# Patient Record
Sex: FEMALE | Race: BLACK OR AFRICAN AMERICAN | Employment: PART TIME | URBAN - NONMETROPOLITAN AREA
[De-identification: names, ages, dates, MRNs, and addresses within clinical notes are randomized per-mention and may not be internally consistent; named-entity substitution may affect disease eponyms.]

---

## 2017-09-28 ENCOUNTER — APPOINTMENT (OUTPATIENT)
Dept: CT IMAGING | Age: 42
DRG: 089 | End: 2017-09-28
Payer: COMMERCIAL

## 2017-09-28 ENCOUNTER — APPOINTMENT (OUTPATIENT)
Dept: ULTRASOUND IMAGING | Age: 42
DRG: 089 | End: 2017-09-28
Payer: COMMERCIAL

## 2017-09-28 ENCOUNTER — HOSPITAL ENCOUNTER (INPATIENT)
Age: 42
LOS: 2 days | Discharge: HOME | DRG: 089 | End: 2017-09-30
Attending: EMERGENCY MEDICINE | Admitting: SURGERY
Payer: COMMERCIAL

## 2017-09-28 ENCOUNTER — APPOINTMENT (OUTPATIENT)
Dept: GENERAL RADIOLOGY | Age: 42
DRG: 089 | End: 2017-09-28
Payer: COMMERCIAL

## 2017-09-28 DIAGNOSIS — R91.8 MULTIPLE NODULES OF LUNG: ICD-10-CM

## 2017-09-28 DIAGNOSIS — D64.9 CHRONIC ANEMIA: ICD-10-CM

## 2017-09-28 DIAGNOSIS — S43.401A SPRAIN OF RIGHT SHOULDER, UNSPECIFIED SHOULDER SPRAIN TYPE, INITIAL ENCOUNTER: ICD-10-CM
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
## 2017-09-28 DIAGNOSIS — R91.1 INCIDENTAL PULMONARY NODULE: ICD-10-CM

## 2017-09-28 DIAGNOSIS — S01.511A LIP LACERATION, INITIAL ENCOUNTER: ICD-10-CM
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
## 2017-09-28 DIAGNOSIS — S27.321A CONTUSION OF LEFT LUNG, INITIAL ENCOUNTER: ICD-10-CM

## 2017-09-28 PROBLEM — S30.811A ABDOMINAL WALL ABRASION: Status: ACTIVE | Noted: 2017-09-28

## 2017-09-28 PROBLEM — S06.0XAA CLOSED HEAD INJURY WITH CONCUSSION: Status: ACTIVE | Noted: 2017-09-28

## 2017-09-28 LAB
ALBUMIN SERPL-MCNC: 4.2 G/DL (ref 3.5–5.1)
ALP BLD-CCNC: 89 U/L (ref 38–126)
ALT SERPL-CCNC: 22 U/L (ref 11–66)
ANION GAP SERPL CALCULATED.3IONS-SCNC: 11 MEQ/L (ref 8–16)
ANISOCYTOSIS: ABNORMAL
APTT: 29.3 SECONDS (ref 22–38)
AST SERPL-CCNC: 29 U/L (ref 5–40)
BASOPHILIA: ABNORMAL
BASOPHILS # BLD: 1.2 %
BASOPHILS ABSOLUTE: 0.1 THOU/MM3 (ref 0–0.1)
BILIRUB SERPL-MCNC: 0.2 MG/DL (ref 0.3–1.2)
BILIRUBIN URINE: NEGATIVE
BLOOD, URINE: NEGATIVE
BUN BLDV-MCNC: 12 MG/DL (ref 7–22)
CALCIUM SERPL-MCNC: 9 MG/DL (ref 8.5–10.5)
CHARACTER, URINE: NORMAL
CHLORIDE BLD-SCNC: 102 MEQ/L (ref 98–111)
CO2: 25 MEQ/L (ref 23–33)
COLOR: YELLOW
CREAT SERPL-MCNC: 0.8 MG/DL (ref 0.4–1.2)
DIFFERENTIAL TYPE: ABNORMAL
EKG ATRIAL RATE: 86 BPM
EKG P AXIS: 60 DEGREES
EKG P-R INTERVAL: 162 MS
EKG Q-T INTERVAL: 350 MS
EKG QRS DURATION: 84 MS
EKG QTC CALCULATION (BAZETT): 418 MS
EKG R AXIS: 74 DEGREES
EKG T AXIS: 33 DEGREES
EKG VENTRICULAR RATE: 86 BPM
EOSINOPHIL # BLD: 3.1 %
EOSINOPHILS ABSOLUTE: 0.1 THOU/MM3 (ref 0–0.4)
GLUCOSE BLD-MCNC: 111 MG/DL (ref 70–108)
GLUCOSE URINE: NEGATIVE MG/DL
HCT VFR BLD CALC: 27.3 % (ref 37–47)
HCT VFR BLD CALC: 29.8 % (ref 37–47)
HEMOGLOBIN: 8.2 GM/DL (ref 12–16)
HEMOGLOBIN: 8.9 GM/DL (ref 12–16)
HYPOCHROMIA: ABNORMAL
INR BLD: 0.93 (ref 0.85–1.13)
KETONES, URINE: NEGATIVE
LEUKOCYTE ESTERASE, URINE: NEGATIVE
LIPASE: 37.8 U/L (ref 5.6–51.3)
LYMPHOCYTES # BLD: 15.6 %
LYMPHOCYTES ABSOLUTE: 0.7 THOU/MM3 (ref 1–4.8)
MCH RBC QN AUTO: 18.6 PG (ref 27–31)
MCHC RBC AUTO-ENTMCNC: 30 GM/DL (ref 33–37)
MCV RBC AUTO: 61.9 FL (ref 81–99)
MICROCYTES: ABNORMAL
MONOCYTES # BLD: 12.1 %
MONOCYTES ABSOLUTE: 0.5 THOU/MM3 (ref 0.4–1.3)
NITRITE, URINE: NEGATIVE
NUCLEATED RED BLOOD CELLS: 0 /100 WBC
OSMOLALITY CALCULATION: 276.1 MOSMOL/KG (ref 275–300)
OVALOCYTES: ABNORMAL
PATHOLOGIST REVIEW: ABNORMAL
PDW BLD-RTO: 20 % (ref 11.5–14.5)
PH UA: 7.5
PLATELET # BLD: 278 THOU/MM3 (ref 130–400)
PLATELET ESTIMATE: ADEQUATE
PMV BLD AUTO: 9.1 MCM (ref 7.4–10.4)
POIKILOCYTES: SLIGHT
POTASSIUM SERPL-SCNC: 3.9 MEQ/L (ref 3.5–5.2)
PREGNANCY, SERUM: NEGATIVE
PROTEIN UA: NEGATIVE
RBC # BLD: 4.81 MILL/MM3 (ref 4.2–5.4)
RBC # BLD: ABNORMAL 10*6/UL
SCAN OF BLOOD SMEAR: NORMAL
SEG NEUTROPHILS: 68 %
SEGMENTED NEUTROPHILS ABSOLUTE COUNT: 3 THOU/MM3 (ref 1.8–7.7)
SODIUM BLD-SCNC: 138 MEQ/L (ref 135–145)
SPECIFIC GRAVITY, URINE: 1.01 (ref 1–1.03)
TOTAL PROTEIN: 7.6 G/DL (ref 6.1–8)
TROPONIN T: < 0.01 NG/ML
UROBILINOGEN, URINE: 0.2 EU/DL
WBC # BLD: 4.4 THOU/MM3 (ref 4.8–10.8)

## 2017-09-28 PROCEDURE — 71260 CT THORAX DX C+: CPT

## 2017-09-28 PROCEDURE — 6820000001 HC L2 TRAUMA SURGERY EVALUATION

## 2017-09-28 PROCEDURE — 2580000003 HC RX 258: Performed by: NURSE PRACTITIONER

## 2017-09-28 PROCEDURE — 6360000002 HC RX W HCPCS: Performed by: NURSE PRACTITIONER

## 2017-09-28 PROCEDURE — 1200000003 HC TELEMETRY R&B

## 2017-09-28 PROCEDURE — 81001 URINALYSIS AUTO W/SCOPE: CPT

## 2017-09-28 PROCEDURE — 74176 CT ABD & PELVIS W/O CONTRAST: CPT

## 2017-09-28 PROCEDURE — 99999 PR OFFICE/OUTPT VISIT,PROCEDURE ONLY: CPT | Performed by: NURSE PRACTITIONER

## 2017-09-28 PROCEDURE — 85610 PROTHROMBIN TIME: CPT

## 2017-09-28 PROCEDURE — 90715 TDAP VACCINE 7 YRS/> IM: CPT | Performed by: EMERGENCY MEDICINE

## 2017-09-28 PROCEDURE — 0HQ1XZZ REPAIR FACE SKIN, EXTERNAL APPROACH: ICD-10-PCS | Performed by: EMERGENCY MEDICINE

## 2017-09-28 PROCEDURE — 80053 COMPREHEN METABOLIC PANEL: CPT

## 2017-09-28 PROCEDURE — 6360000004 HC RX CONTRAST MEDICATION: Performed by: NURSE PRACTITIONER

## 2017-09-28 PROCEDURE — 93005 ELECTROCARDIOGRAM TRACING: CPT | Performed by: EMERGENCY MEDICINE

## 2017-09-28 PROCEDURE — 76830 TRANSVAGINAL US NON-OB: CPT

## 2017-09-28 PROCEDURE — 6360000002 HC RX W HCPCS: Performed by: EMERGENCY MEDICINE

## 2017-09-28 PROCEDURE — 85014 HEMATOCRIT: CPT

## 2017-09-28 PROCEDURE — 83690 ASSAY OF LIPASE: CPT

## 2017-09-28 PROCEDURE — 84484 ASSAY OF TROPONIN QUANT: CPT

## 2017-09-28 PROCEDURE — 99285 EMERGENCY DEPT VISIT HI MDM: CPT

## 2017-09-28 PROCEDURE — 99223 1ST HOSP IP/OBS HIGH 75: CPT | Performed by: SURGERY

## 2017-09-28 PROCEDURE — 84703 CHORIONIC GONADOTROPIN ASSAY: CPT

## 2017-09-28 PROCEDURE — 12013 RPR F/E/E/N/L/M 2.6-5.0 CM: CPT

## 2017-09-28 PROCEDURE — 96375 TX/PRO/DX INJ NEW DRUG ADDON: CPT

## 2017-09-28 PROCEDURE — 85730 THROMBOPLASTIN TIME PARTIAL: CPT

## 2017-09-28 PROCEDURE — 6370000000 HC RX 637 (ALT 250 FOR IP): Performed by: NURSE PRACTITIONER

## 2017-09-28 PROCEDURE — 73030 X-RAY EXAM OF SHOULDER: CPT

## 2017-09-28 PROCEDURE — 72125 CT NECK SPINE W/O DYE: CPT

## 2017-09-28 PROCEDURE — 96374 THER/PROPH/DIAG INJ IV PUSH: CPT

## 2017-09-28 PROCEDURE — 6370000000 HC RX 637 (ALT 250 FOR IP): Performed by: SURGERY

## 2017-09-28 PROCEDURE — 70450 CT HEAD/BRAIN W/O DYE: CPT

## 2017-09-28 PROCEDURE — 90471 IMMUNIZATION ADMIN: CPT | Performed by: EMERGENCY MEDICINE

## 2017-09-28 PROCEDURE — 36415 COLL VENOUS BLD VENIPUNCTURE: CPT

## 2017-09-28 PROCEDURE — 85025 COMPLETE CBC W/AUTO DIFF WBC: CPT

## 2017-09-28 PROCEDURE — 76856 US EXAM PELVIC COMPLETE: CPT

## 2017-09-28 PROCEDURE — 85018 HEMOGLOBIN: CPT

## 2017-09-28 RX ORDER — ACETAMINOPHEN 325 MG/1
650 TABLET ORAL EVERY 4 HOURS PRN
Status: DISCONTINUED | OUTPATIENT
Start: 2017-09-28 | End: 2017-09-30 | Stop reason: HOSPADM

## 2017-09-28 RX ORDER — HYDROCODONE BITARTRATE AND ACETAMINOPHEN 5; 325 MG/1; MG/1
1 TABLET ORAL EVERY 4 HOURS PRN
Status: DISCONTINUED | OUTPATIENT
Start: 2017-09-28 | End: 2017-09-29

## 2017-09-28 RX ORDER — FAMOTIDINE 20 MG/1
20 TABLET, FILM COATED ORAL 2 TIMES DAILY
Status: DISCONTINUED | OUTPATIENT
Start: 2017-09-28 | End: 2017-09-30 | Stop reason: HOSPADM

## 2017-09-28 RX ORDER — MORPHINE SULFATE 4 MG/ML
4 INJECTION, SOLUTION INTRAMUSCULAR; INTRAVENOUS
Status: DISCONTINUED | OUTPATIENT
Start: 2017-09-28 | End: 2017-09-30

## 2017-09-28 RX ORDER — IBUPROFEN 200 MG
200 TABLET ORAL EVERY 6 HOURS PRN
COMMUNITY
End: 2018-01-18 | Stop reason: ALTCHOICE

## 2017-09-28 RX ORDER — FERROUS SULFATE 325(65) MG
325 TABLET ORAL 2 TIMES DAILY WITH MEALS
Status: DISCONTINUED | OUTPATIENT
Start: 2017-09-28 | End: 2017-09-30 | Stop reason: HOSPADM

## 2017-09-28 RX ORDER — MORPHINE SULFATE 2 MG/ML
2 INJECTION, SOLUTION INTRAMUSCULAR; INTRAVENOUS
Status: DISCONTINUED | OUTPATIENT
Start: 2017-09-28 | End: 2017-09-30

## 2017-09-28 RX ORDER — HYDROCODONE BITARTRATE AND ACETAMINOPHEN 5; 325 MG/1; MG/1
2 TABLET ORAL EVERY 4 HOURS PRN
Status: DISCONTINUED | OUTPATIENT
Start: 2017-09-28 | End: 2017-09-29

## 2017-09-28 RX ORDER — LIDOCAINE HYDROCHLORIDE 10 MG/ML
INJECTION, SOLUTION INFILTRATION; PERINEURAL
Status: DISCONTINUED
Start: 2017-09-28 | End: 2017-09-28

## 2017-09-28 RX ORDER — ONDANSETRON 2 MG/ML
4 INJECTION INTRAMUSCULAR; INTRAVENOUS EVERY 6 HOURS PRN
Status: DISCONTINUED | OUTPATIENT
Start: 2017-09-28 | End: 2017-09-29

## 2017-09-28 RX ORDER — SODIUM CHLORIDE 0.9 % (FLUSH) 0.9 %
10 SYRINGE (ML) INJECTION PRN
Status: DISCONTINUED | OUTPATIENT
Start: 2017-09-28 | End: 2017-09-30 | Stop reason: HOSPADM

## 2017-09-28 RX ORDER — SODIUM CHLORIDE 0.9 % (FLUSH) 0.9 %
10 SYRINGE (ML) INJECTION EVERY 12 HOURS SCHEDULED
Status: DISCONTINUED | OUTPATIENT
Start: 2017-09-28 | End: 2017-09-30 | Stop reason: HOSPADM

## 2017-09-28 RX ORDER — LORAZEPAM 2 MG/ML
0.5 INJECTION INTRAMUSCULAR ONCE
Status: COMPLETED | OUTPATIENT
Start: 2017-09-28 | End: 2017-09-28

## 2017-09-28 RX ORDER — MORPHINE SULFATE 2 MG/ML
2 INJECTION, SOLUTION INTRAMUSCULAR; INTRAVENOUS ONCE
Status: COMPLETED | OUTPATIENT
Start: 2017-09-28 | End: 2017-09-28

## 2017-09-28 RX ORDER — SODIUM CHLORIDE 9 MG/ML
INJECTION, SOLUTION INTRAVENOUS CONTINUOUS
Status: DISCONTINUED | OUTPATIENT
Start: 2017-09-28 | End: 2017-09-29

## 2017-09-28 RX ADMIN — FAMOTIDINE 20 MG: 20 TABLET, FILM COATED ORAL at 20:04

## 2017-09-28 RX ADMIN — SODIUM CHLORIDE: 9 INJECTION, SOLUTION INTRAVENOUS at 15:10

## 2017-09-28 RX ADMIN — TETANUS TOXOID, REDUCED DIPHTHERIA TOXOID AND ACELLULAR PERTUSSIS VACCINE, ADSORBED 0.5 ML: 5; 2.5; 8; 8; 2.5 SUSPENSION INTRAMUSCULAR at 08:45

## 2017-09-28 RX ADMIN — LORAZEPAM 0.5 MG: 2 INJECTION INTRAMUSCULAR; INTRAVENOUS at 08:15

## 2017-09-28 RX ADMIN — Medication 325 MG: at 19:26

## 2017-09-28 RX ADMIN — HYDROCODONE BITARTRATE AND ACETAMINOPHEN 2 TABLET: 5; 325 TABLET ORAL at 20:08

## 2017-09-28 RX ADMIN — IOPAMIDOL 85 ML: 755 INJECTION, SOLUTION INTRAVENOUS at 09:43

## 2017-09-28 RX ADMIN — MORPHINE SULFATE 2 MG: 2 INJECTION, SOLUTION INTRAMUSCULAR; INTRAVENOUS at 08:15

## 2017-09-28 RX ADMIN — HYDROCODONE BITARTRATE AND ACETAMINOPHEN 2 TABLET: 5; 325 TABLET ORAL at 15:09

## 2017-09-28 RX ADMIN — ONDANSETRON 4 MG: 2 INJECTION INTRAMUSCULAR; INTRAVENOUS at 21:16

## 2017-09-28 ASSESSMENT — ENCOUNTER SYMPTOMS
EYE PAIN: 0
VOMITING: 0
BACK PAIN: 1
APNEA: 0
COLOR CHANGE: 0
BACK PAIN: 0
EYE REDNESS: 0
CHOKING: 0
BLOOD IN STOOL: 0
COUGH: 0
STRIDOR: 0
VOICE CHANGE: 0
RHINORRHEA: 0
EYE ITCHING: 0
SINUS PRESSURE: 0
ABDOMINAL PAIN: 0
ABDOMINAL PAIN: 1
CONSTIPATION: 0
EYE DISCHARGE: 0
PHOTOPHOBIA: 0
NAUSEA: 0
ABDOMINAL DISTENTION: 0
DIARRHEA: 0
WHEEZING: 0
FACIAL SWELLING: 0
TROUBLE SWALLOWING: 0
SHORTNESS OF BREATH: 0
SORE THROAT: 0
CHEST TIGHTNESS: 0

## 2017-09-28 ASSESSMENT — PAIN DESCRIPTION - LOCATION: LOCATION: NECK

## 2017-09-28 ASSESSMENT — PAIN SCALES - GENERAL
PAINLEVEL_OUTOF10: 8
PAINLEVEL_OUTOF10: 10

## 2017-09-28 ASSESSMENT — PAIN DESCRIPTION - PAIN TYPE: TYPE: ACUTE PAIN
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
## 2017-09-28 NOTE — ED NOTES
Pt returned to - family at bedside. Pt appears restful on cart. Will monitor.      Melvina Gates RN  09/28/17 [id number]

## 2017-09-28 NOTE — ED PROVIDER NOTES
Lac Repair  Date/Time: 9/28/2017 9:20 AM  Performed by: Goran Morocho by: Rosalee Griffith     Consent:     Consent obtained:  Verbal    Consent given by:  Patient    Risks discussed:  Infection, need for additional repair, pain, nerve damage, poor cosmetic result, poor wound healing and tendon damage    Alternatives discussed:  No treatment  Anesthesia (see MAR for exact dosages): Anesthesia method:  Local infiltration    Local anesthetic:  Lidocaine 1% w/o epi  Laceration details:     Location:  Lip    Length (cm):  1.5    Depth (mm):  5  Repair type:     Repair type:  Simple  Pre-procedure details:     Preparation:  Patient was prepped and draped in usual sterile fashion  Exploration:     Hemostasis achieved with:  Direct pressure    Wound extent: no areolar tissue violation noted, no fascia violation noted, no foreign bodies/material noted, no muscle damage noted, no nerve damage noted, no tendon damage noted, no underlying fracture noted and no vascular damage noted      Contaminated: no    Treatment:     Amount of cleaning:  Standard    Visualized foreign bodies/material removed: no    Skin repair:     Repair method:  Sutures    Suture size:  5-0    Suture material:  Nylon    Suture technique:  Simple interrupted    Number of sutures:  3  Approximation:     Approximation:  Close  Post-procedure details:     Dressing:  Open (no dressing)    Patient tolerance of procedure: Tolerated well, no immediate complications  Lac Repair  Date/Time: 9/28/2017 9:30 AM  Performed by: Goran Morocho by: Rosalee Griffith     Consent:     Consent obtained:  Verbal    Consent given by:  Patient    Risks discussed:  Infection, need for additional repair, pain, poor cosmetic result, retained foreign body, tendon damage, poor wound healing and nerve damage    Alternatives discussed:  No treatment and delayed treatment  Anesthesia (see MAR for exact dosages):      Anesthesia method:  Local infiltration    Local

## 2017-09-28 NOTE — ED NOTES
Dr Jazzmine Cai at bedside to update pt and family. Pt cleared from [address] [place] [address] per Dr Jazzmine Cai at this time. Pt sitting up on cart and tolerating well. Will monitor.       Tasha Torres RN  09/28/17 0151
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
## 2017-09-28 NOTE — FLOWSHEET NOTE
09/28/17 1924   Encounter Summary   Services provided to: Patient   Referral/Consult From: Nurse   Support System Children;Friends/neighbors   [facility] [place] [facility]   Continue Visiting Yes  (09/28/17 Jehovah Witness BONNIE Galindo will be brought in tomorr)   Complexity of Encounter Low   Length of Encounter 15 minutes   Routine   Type Follow up   Assessment Approachable;Coping; Hopeful   Intervention Active listening;Discussed belief system/Zoroastrian practices/indira;Discussed illness/injury and it's impact   Outcome Expressed gratitude   Spiritual/Mormon   Type Spiritual support     Pt is a 35year old  female who was referred by her nurse. During my arrival in the room, patient was sitting in her bed eating dinner. Patient immediately said that she is of the Jehovah Witness indira. I asked patient if she had a copy of her official Advanced Directive paper work for us to make copy for her file here at the hospital. Pt mentioned that she has one but have it at home. She said she was going to contact her son and her friend to bring it int he morning for us to make copy. I offered words of comfort and encouragement for patient and family as she continue to receive her care from us. Will follow up for continue emotional support to patient and family.

## 2017-09-29 ENCOUNTER — APPOINTMENT (OUTPATIENT)
Dept: GENERAL RADIOLOGY | Age: 42
DRG: 089 | End: 2017-09-29
Payer: COMMERCIAL

## 2017-09-29 PROBLEM — R04.2 HEMOPTYSIS: Status: ACTIVE | Noted: 2017-09-29

## 2017-09-29 PROBLEM — D25.9 UTERINE FIBROID: Status: ACTIVE | Noted: 2017-09-29

## 2017-09-29 LAB
ANION GAP SERPL CALCULATED.3IONS-SCNC: 13 MEQ/L (ref 8–16)
BUN BLDV-MCNC: 9 MG/DL (ref 7–22)
CALCIUM SERPL-MCNC: 8.5 MG/DL (ref 8.5–10.5)
CHLORIDE BLD-SCNC: 105 MEQ/L (ref 98–111)
CO2: 22 MEQ/L (ref 23–33)
CREAT SERPL-MCNC: 0.6 MG/DL (ref 0.4–1.2)
GFR SERPL CREATININE-BSD FRML MDRD: > 90 ML/MIN/1.73M2
GLUCOSE BLD-MCNC: 100 MG/DL (ref 70–108)
HCT VFR BLD CALC: 27.2 % (ref 37–47)
HCT VFR BLD CALC: 28.6 % (ref 37–47)
HEMOGLOBIN: 8.2 GM/DL (ref 12–16)
HEMOGLOBIN: 8.5 GM/DL (ref 12–16)
MCH RBC QN AUTO: 18.4 PG (ref 27–31)
MCHC RBC AUTO-ENTMCNC: 29.7 GM/DL (ref 33–37)
MCV RBC AUTO: 62 FL (ref 81–99)
PDW BLD-RTO: 20 % (ref 11.5–14.5)
PLATELET # BLD: 282 THOU/MM3 (ref 130–400)
PMV BLD AUTO: 9.1 MCM (ref 7.4–10.4)
POTASSIUM SERPL-SCNC: 4.4 MEQ/L (ref 3.5–5.2)
RBC # BLD: 4.61 MILL/MM3 (ref 4.2–5.4)
SEDIMENTATION RATE, ERYTHROCYTE: 17 MM/HR (ref 0–20)
SODIUM BLD-SCNC: 140 MEQ/L (ref 135–145)
WBC # BLD: 6 THOU/MM3 (ref 4.8–10.8)

## 2017-09-29 PROCEDURE — 6360000002 HC RX W HCPCS: Performed by: NURSE PRACTITIONER

## 2017-09-29 PROCEDURE — 1200000003 HC TELEMETRY R&B

## 2017-09-29 PROCEDURE — 87077 CULTURE AEROBIC IDENTIFY: CPT

## 2017-09-29 PROCEDURE — 85027 COMPLETE CBC AUTOMATED: CPT

## 2017-09-29 PROCEDURE — 86698 HISTOPLASMA ANTIBODY: CPT

## 2017-09-29 PROCEDURE — 2580000003 HC RX 258: Performed by: NURSE PRACTITIONER

## 2017-09-29 PROCEDURE — 36415 COLL VENOUS BLD VENIPUNCTURE: CPT

## 2017-09-29 PROCEDURE — 87147 CULTURE TYPE IMMUNOLOGIC: CPT

## 2017-09-29 PROCEDURE — 87116 MYCOBACTERIA CULTURE: CPT

## 2017-09-29 PROCEDURE — 85651 RBC SED RATE NONAUTOMATED: CPT

## 2017-09-29 PROCEDURE — 96125 COGNITIVE TEST BY HC PRO: CPT

## 2017-09-29 PROCEDURE — 85018 HEMOGLOBIN: CPT

## 2017-09-29 PROCEDURE — 87070 CULTURE OTHR SPECIMN AEROBIC: CPT

## 2017-09-29 PROCEDURE — 99233 SBSQ HOSP IP/OBS HIGH 50: CPT | Performed by: SURGERY

## 2017-09-29 PROCEDURE — 87186 SC STD MICRODIL/AGAR DIL: CPT

## 2017-09-29 PROCEDURE — 87206 SMEAR FLUORESCENT/ACID STAI: CPT

## 2017-09-29 PROCEDURE — 85014 HEMATOCRIT: CPT

## 2017-09-29 PROCEDURE — 87205 SMEAR GRAM STAIN: CPT

## 2017-09-29 PROCEDURE — 6360000002 HC RX W HCPCS: Performed by: SURGERY

## 2017-09-29 PROCEDURE — 80048 BASIC METABOLIC PNL TOTAL CA: CPT

## 2017-09-29 PROCEDURE — 87385 HISTOPLASMA CAPSUL AG IA: CPT

## 2017-09-29 PROCEDURE — 71020 XR CHEST STANDARD TWO VW: CPT

## 2017-09-29 PROCEDURE — 99999 PR OFFICE/OUTPT VISIT,PROCEDURE ONLY: CPT | Performed by: NURSE PRACTITIONER

## 2017-09-29 PROCEDURE — 6370000000 HC RX 637 (ALT 250 FOR IP): Performed by: SURGERY

## 2017-09-29 PROCEDURE — 6370000000 HC RX 637 (ALT 250 FOR IP): Performed by: NURSE PRACTITIONER

## 2017-09-29 RX ORDER — OXYCODONE HYDROCHLORIDE AND ACETAMINOPHEN 5; 325 MG/1; MG/1
2 TABLET ORAL EVERY 4 HOURS PRN
Status: DISCONTINUED | OUTPATIENT
Start: 2017-09-29 | End: 2017-09-30 | Stop reason: HOSPADM

## 2017-09-29 RX ORDER — METOCLOPRAMIDE HYDROCHLORIDE 5 MG/ML
10 INJECTION INTRAMUSCULAR; INTRAVENOUS EVERY 6 HOURS
Status: DISCONTINUED | OUTPATIENT
Start: 2017-09-29 | End: 2017-09-30

## 2017-09-29 RX ORDER — PROMETHAZINE HYDROCHLORIDE 25 MG/1
12.5 TABLET ORAL EVERY 6 HOURS PRN
Status: DISCONTINUED | OUTPATIENT
Start: 2017-09-29 | End: 2017-09-30 | Stop reason: HOSPADM

## 2017-09-29 RX ORDER — DOCUSATE SODIUM 100 MG/1
100 CAPSULE, LIQUID FILLED ORAL 2 TIMES DAILY
Status: DISCONTINUED | OUTPATIENT
Start: 2017-09-29 | End: 2017-09-30 | Stop reason: HOSPADM

## 2017-09-29 RX ORDER — ONDANSETRON 2 MG/ML
4 INJECTION INTRAMUSCULAR; INTRAVENOUS EVERY 4 HOURS PRN
Status: DISCONTINUED | OUTPATIENT
Start: 2017-09-29 | End: 2017-09-30 | Stop reason: HOSPADM

## 2017-09-29 RX ORDER — OXYCODONE HYDROCHLORIDE AND ACETAMINOPHEN 5; 325 MG/1; MG/1
1 TABLET ORAL EVERY 4 HOURS PRN
Status: DISCONTINUED | OUTPATIENT
Start: 2017-09-29 | End: 2017-09-30 | Stop reason: HOSPADM

## 2017-09-29 RX ORDER — ONDANSETRON 4 MG/1
4 TABLET, ORALLY DISINTEGRATING ORAL EVERY 6 HOURS
Status: DISCONTINUED | OUTPATIENT
Start: 2017-09-29 | End: 2017-09-30 | Stop reason: HOSPADM

## 2017-09-29 RX ADMIN — ONDANSETRON 4 MG: 2 INJECTION INTRAMUSCULAR; INTRAVENOUS at 12:47

## 2017-09-29 RX ADMIN — MORPHINE SULFATE 2 MG: 2 INJECTION, SOLUTION INTRAMUSCULAR; INTRAVENOUS at 08:11

## 2017-09-29 RX ADMIN — Medication 325 MG: at 16:45

## 2017-09-29 RX ADMIN — ONDANSETRON 4 MG: 2 INJECTION INTRAMUSCULAR; INTRAVENOUS at 16:45

## 2017-09-29 RX ADMIN — Medication 10 ML: at 08:15

## 2017-09-29 RX ADMIN — ONDANSETRON 4 MG: 2 INJECTION INTRAMUSCULAR; INTRAVENOUS at 06:24

## 2017-09-29 RX ADMIN — MAGNESIUM HYDROXIDE 30 ML: 400 SUSPENSION ORAL at 08:11

## 2017-09-29 RX ADMIN — DOCUSATE SODIUM 100 MG: 100 CAPSULE ORAL at 20:33

## 2017-09-29 RX ADMIN — FAMOTIDINE 20 MG: 20 TABLET, FILM COATED ORAL at 19:54

## 2017-09-29 RX ADMIN — Medication 10 ML: at 20:33

## 2017-09-29 RX ADMIN — MORPHINE SULFATE 2 MG: 2 INJECTION, SOLUTION INTRAMUSCULAR; INTRAVENOUS at 16:45

## 2017-09-29 RX ADMIN — MORPHINE SULFATE 2 MG: 2 INJECTION, SOLUTION INTRAMUSCULAR; INTRAVENOUS at 12:47

## 2017-09-29 RX ADMIN — METOCLOPRAMIDE 10 MG: 5 INJECTION, SOLUTION INTRAMUSCULAR; INTRAVENOUS at 19:54

## 2017-09-29 RX ADMIN — ONDANSETRON 4 MG: 4 TABLET, ORALLY DISINTEGRATING ORAL at 20:33

## 2017-09-29 RX ADMIN — METOCLOPRAMIDE 10 MG: 5 INJECTION, SOLUTION INTRAMUSCULAR; INTRAVENOUS at 14:17

## 2017-09-29 RX ADMIN — HYDROCODONE BITARTRATE AND ACETAMINOPHEN 2 TABLET: 5; 325 TABLET ORAL at 06:24

## 2017-09-29 ASSESSMENT — PAIN SCALES - GENERAL
PAINLEVEL_OUTOF10: 9
PAINLEVEL_OUTOF10: 9
PAINLEVEL_OUTOF10: 7
PAINLEVEL_OUTOF10: 6
PAINLEVEL_OUTOF10: 8
PAINLEVEL_OUTOF10: 6

## 2017-09-29 ASSESSMENT — PAIN DESCRIPTION - LOCATION
LOCATION: NECK
LOCATION: NECK

## 2017-09-29 ASSESSMENT — PAIN DESCRIPTION - PAIN TYPE
TYPE: ACUTE PAIN
TYPE: ACUTE PAIN

## 2017-09-29 NOTE — PROGRESS NOTES
1840- secure message sent to Dr Sravan Brown, making sure he got consult, and will see.    1859- Message back that he will be in to see, finishing up in OR and then up

## 2017-09-29 NOTE — PROGRESS NOTES
1115- secure message sent to Dr Kenton Claros for consult.    1116- called back and said will be in to see
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
## 2017-09-30 ENCOUNTER — APPOINTMENT (OUTPATIENT)
Dept: CT IMAGING | Age: 42
DRG: 089 | End: 2017-09-30
Payer: COMMERCIAL

## 2017-09-30 ENCOUNTER — APPOINTMENT (OUTPATIENT)
Dept: GENERAL RADIOLOGY | Age: 42
DRG: 089 | End: 2017-09-30
Payer: COMMERCIAL

## 2017-09-30 VITALS
HEIGHT: 62 IN | BODY MASS INDEX: 35.11 KG/M2 | HEART RATE: 85 BPM | SYSTOLIC BLOOD PRESSURE: 164 MMHG | RESPIRATION RATE: 18 BRPM | OXYGEN SATURATION: 98 % | TEMPERATURE: 98.2 F | WEIGHT: 190.8 LBS | DIASTOLIC BLOOD PRESSURE: 89 MMHG

## 2017-09-30 LAB
HCT VFR BLD CALC: 27.9 % (ref 37–47)
HEMOGLOBIN: 8.4 GM/DL (ref 12–16)
MCH RBC QN AUTO: 18.5 PG (ref 27–31)
MCHC RBC AUTO-ENTMCNC: 30 GM/DL (ref 33–37)
MCV RBC AUTO: 61.7 FL (ref 81–99)
PDW BLD-RTO: 20.5 % (ref 11.5–14.5)
PLATELET # BLD: 296 THOU/MM3 (ref 130–400)
PMV BLD AUTO: 8.8 MCM (ref 7.4–10.4)
RBC # BLD: 4.51 MILL/MM3 (ref 4.2–5.4)
WBC # BLD: 6.7 THOU/MM3 (ref 4.8–10.8)

## 2017-09-30 PROCEDURE — 71250 CT THORAX DX C-: CPT

## 2017-09-30 PROCEDURE — 36415 COLL VENOUS BLD VENIPUNCTURE: CPT

## 2017-09-30 PROCEDURE — 99999 PR OFFICE/OUTPT VISIT,PROCEDURE ONLY: CPT | Performed by: NURSE PRACTITIONER

## 2017-09-30 PROCEDURE — 85027 COMPLETE CBC AUTOMATED: CPT

## 2017-09-30 PROCEDURE — 71020 XR CHEST STANDARD TWO VW: CPT

## 2017-09-30 PROCEDURE — 6360000002 HC RX W HCPCS: Performed by: NURSE PRACTITIONER

## 2017-09-30 PROCEDURE — 6360000002 HC RX W HCPCS: Performed by: THORACIC SURGERY (CARDIOTHORACIC VASCULAR SURGERY)

## 2017-09-30 PROCEDURE — 6370000000 HC RX 637 (ALT 250 FOR IP): Performed by: NURSE PRACTITIONER

## 2017-09-30 PROCEDURE — 2580000003 HC RX 258: Performed by: NURSE PRACTITIONER

## 2017-09-30 PROCEDURE — 99232 SBSQ HOSP IP/OBS MODERATE 35: CPT | Performed by: THORACIC SURGERY (CARDIOTHORACIC VASCULAR SURGERY)

## 2017-09-30 PROCEDURE — 99238 HOSP IP/OBS DSCHRG MGMT 30/<: CPT | Performed by: SURGERY

## 2017-09-30 RX ORDER — LIDOCAINE 50 MG/G
1 PATCH TOPICAL DAILY
Status: DISCONTINUED | OUTPATIENT
Start: 2017-09-30 | End: 2017-09-30 | Stop reason: HOSPADM

## 2017-09-30 RX ORDER — FERROUS SULFATE 325(65) MG
325 TABLET ORAL 2 TIMES DAILY WITH MEALS
Qty: 30 TABLET | Refills: 3 | Status: ON HOLD | OUTPATIENT
Start: 2017-09-30 | End: 2018-01-26 | Stop reason: HOSPADM

## 2017-09-30 RX ORDER — OXYCODONE HYDROCHLORIDE AND ACETAMINOPHEN 5; 325 MG/1; MG/1
1 TABLET ORAL EVERY 6 HOURS PRN
Qty: 20 TABLET | Refills: 0 | Status: SHIPPED | OUTPATIENT
Start: 2017-09-30 | End: 2017-10-05

## 2017-09-30 RX ORDER — ONDANSETRON 4 MG/1
4 TABLET, ORALLY DISINTEGRATING ORAL EVERY 6 HOURS PRN
Qty: 30 TABLET | Refills: 0 | Status: SHIPPED | OUTPATIENT
Start: 2017-09-30 | End: 2017-10-05

## 2017-09-30 RX ORDER — LIDOCAINE 50 MG/G
OINTMENT TOPICAL
Qty: 1 TUBE | Refills: 1 | Status: SHIPPED | OUTPATIENT
Start: 2017-09-30 | End: 2018-01-18 | Stop reason: ALTCHOICE

## 2017-09-30 RX ORDER — LIDOCAINE 50 MG/G
1 PATCH TOPICAL EVERY 24 HOURS
Qty: 5 PATCH | Refills: 1 | Status: SHIPPED | OUTPATIENT
Start: 2017-09-30 | End: 2018-01-18 | Stop reason: ALTCHOICE

## 2017-09-30 RX ORDER — PSEUDOEPHEDRINE HCL 30 MG
100 TABLET ORAL 2 TIMES DAILY
Qty: 30 CAPSULE | Refills: 0 | Status: SHIPPED | OUTPATIENT
Start: 2017-09-30 | End: 2017-10-05

## 2017-09-30 RX ADMIN — METOCLOPRAMIDE 10 MG: 5 INJECTION, SOLUTION INTRAMUSCULAR; INTRAVENOUS at 01:47

## 2017-09-30 RX ADMIN — Medication 325 MG: at 09:33

## 2017-09-30 RX ADMIN — ACETAMINOPHEN 650 MG: 325 TABLET ORAL at 13:43

## 2017-09-30 RX ADMIN — ONDANSETRON 4 MG: 4 TABLET, ORALLY DISINTEGRATING ORAL at 14:40

## 2017-09-30 RX ADMIN — ONDANSETRON 4 MG: 4 TABLET, ORALLY DISINTEGRATING ORAL at 01:47

## 2017-09-30 RX ADMIN — DOCUSATE SODIUM 100 MG: 100 CAPSULE ORAL at 09:33

## 2017-09-30 RX ADMIN — ENOXAPARIN SODIUM 40 MG: 40 INJECTION SUBCUTANEOUS at 09:33

## 2017-09-30 RX ADMIN — FAMOTIDINE 20 MG: 20 TABLET, FILM COATED ORAL at 09:33

## 2017-09-30 RX ADMIN — Medication 10 ML: at 09:32

## 2017-09-30 ASSESSMENT — PAIN DESCRIPTION - LOCATION
LOCATION: NECK
LOCATION: NECK

## 2017-09-30 ASSESSMENT — PAIN SCALES - GENERAL
PAINLEVEL_OUTOF10: 3
PAINLEVEL_OUTOF10: 7
PAINLEVEL_OUTOF10: 3
PAINLEVEL_OUTOF10: 5

## 2017-09-30 ASSESSMENT — PAIN DESCRIPTION - PAIN TYPE
TYPE: ACUTE PAIN
TYPE: ACUTE PAIN

## 2017-09-30 ASSESSMENT — PAIN DESCRIPTION - FREQUENCY: FREQUENCY: CONTINUOUS

## 2017-09-30 ASSESSMENT — PAIN DESCRIPTION - DESCRIPTORS: DESCRIPTORS: SORE;ACHING
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
## 2017-09-30 NOTE — DISCHARGE INSTR - ACTIVITY
ACTIVITY INSTRUCTIONS:  [x] Rest today. Resume light to normal activity tomorrow. [x] Do not engage in strenuous activity until released to do so. [x] Do not drive for 3-5 days and avoid heavy lifting, tugging, pullings greater than 10 lbs until seen in the office by Dr. Jojo Wong.
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
## 2017-09-30 NOTE — DISCHARGE INSTR - DIET

## 2017-09-30 NOTE — CONSULTS
Brief Intervention and Referral to Treatment Summary: Patient is a 35 y.o., -American female, that was referred to the [facility] OF [facility] for an addictions consult. Patient was advised of the consult and its purpose. Patient declined the consult indicating that she is already a patient at [facility]. Patient was provided PHQ-9, AUDIT and DAST Screening:  NA-Patient Declined the Assessment    PHQ-9 Score: NA-Patient Declined the Assessment  AUDIT Score:  NA-Patient Declined the Assessment  DAST Score: NA-Patient Declined the Assessment    Patients substance use is considered    Low Risk/Healthy NA-Patient Declined the Assessment  Risk NA-Patient Declined the Assessment  Harmful NA-Patient Declined the Assessment  Dependent NA-Patient Declined the Assessment    Patients depression is considered:    Minimal NA-Patient Declined the Assessment  Mild NA-Patient Declined the Assessment  Moderate NA-Patient Declined the Assessment  Moderately Severe NA-Patient Declined the Assessment  Severe NA-Patient Declined the Assessment    Brief Education was provided:    Patient was not receptive      Brief Intervention Is Provided (Only for AUDIT or DAST, there is no brief intervention for Depression)    Patient denies readiness to decrease and/or stop use and a plan was not discussed      Recommendations/Referrals for Brief and/or Specialized Treatment Provided to Patient NA-Patient Declined the Assessment. She reports that she is already a client at [facility] and did not need the assessment at this time.
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
## 2017-09-30 NOTE — PROGRESS NOTES
Spoke with Dr. Lilly Rodriguez, indicated for a chest x-ray PA and lateral views and CBC in AM. No other orders at this time.

## 2017-10-01 LAB
GRAM STAIN RESULT: ABNORMAL
ORGANISM: ABNORMAL
RESPIRATORY CULTURE: ABNORMAL
RESPIRATORY CULTURE: ABNORMAL

## 2017-10-02 LAB — HISTOPLASMA AB, ID: NORMAL

## 2017-10-02 NOTE — CARE COORDINATION
Late Entry:   10/2/17, 6:51 AM    Discharge plan discussed by  and . Discharge plan reviewed with patient/ family. Patient/ family verbalize understanding of discharge plan and are in agreement with plan. Understanding was demonstrated using the teach back method.

## 2017-10-03 ENCOUNTER — TELEPHONE (OUTPATIENT)
Dept: FAMILY MEDICINE CLINIC | Age: 42
End: 2017-10-03

## 2017-10-05 ENCOUNTER — OFFICE VISIT (OUTPATIENT)
Dept: FAMILY MEDICINE CLINIC | Age: 42
End: 2017-10-05
Payer: MEDICARE

## 2017-10-05 VITALS
RESPIRATION RATE: 20 BRPM | BODY MASS INDEX: 33.87 KG/M2 | WEIGHT: 185.2 LBS | TEMPERATURE: 98.2 F | DIASTOLIC BLOOD PRESSURE: 82 MMHG | HEART RATE: 84 BPM | SYSTOLIC BLOOD PRESSURE: 118 MMHG
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
## 2017-10-05 DIAGNOSIS — S01.512D LACERATION OF ORAL CAVITY, SUBSEQUENT ENCOUNTER: ICD-10-CM

## 2017-10-05 PROCEDURE — 99495 TRANSJ CARE MGMT MOD F2F 14D: CPT | Performed by: FAMILY MEDICINE

## 2017-10-05 PROCEDURE — 90471 IMMUNIZATION ADMIN: CPT | Performed by: FAMILY MEDICINE

## 2017-10-05 PROCEDURE — 90688 IIV4 VACCINE SPLT 0.5 ML IM: CPT | Performed by: FAMILY MEDICINE

## 2017-10-05 ASSESSMENT — PATIENT HEALTH QUESTIONNAIRE - PHQ9
2. FEELING DOWN, DEPRESSED OR HOPELESS: 0
1. LITTLE INTEREST OR PLEASURE IN DOING THINGS: 0
SUM OF ALL RESPONSES TO PHQ QUESTIONS 1-9: 0
SUM OF ALL RESPONSES TO PHQ9 QUESTIONS 1 & 2: 0

## 2017-10-05 NOTE — PROGRESS NOTES
After obtaining consent, and per orders of Dr Hair Longo, patient given Fluzone 0.5 ml in right deltoid IM by Nae Santana CMA. Instructed to notify us of any problems.

## 2017-10-06 ENCOUNTER — TELEPHONE (OUTPATIENT)
Dept: SURGERY | Age: 42
End: 2017-10-06

## 2017-10-06 LAB
GFR SERPL CREATININE-BSD FRML MDRD: 78 ML/MIN/1.73M2
MISC REFERENCE: NORMAL

## 2017-10-09 ENCOUNTER — TELEPHONE (OUTPATIENT)
Dept: FAMILY MEDICINE CLINIC | Age: 42
End: 2017-10-09

## 2017-10-12 ENCOUNTER — OFFICE VISIT (OUTPATIENT)
Dept: SURGERY | Age: 42
End: 2017-10-12
Payer: MEDICARE

## 2017-10-12 VITALS
HEART RATE: 88 BPM | RESPIRATION RATE: 18 BRPM | HEIGHT: 62 IN | TEMPERATURE: 97.8 F | BODY MASS INDEX: 34.48 KG/M2 | DIASTOLIC BLOOD PRESSURE: 88 MMHG | OXYGEN SATURATION: 97 % | SYSTOLIC BLOOD PRESSURE: 146 MMHG | WEIGHT: 187.4 LBS
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
## 2017-10-12 PROCEDURE — 99214 OFFICE O/P EST MOD 30 MIN: CPT | Performed by: SURGERY

## 2017-10-12 PROCEDURE — 1111F DSCHRG MED/CURRENT MED MERGE: CPT | Performed by: SURGERY

## 2017-10-12 PROCEDURE — G8484 FLU IMMUNIZE NO ADMIN: HCPCS | Performed by: SURGERY

## 2017-10-12 PROCEDURE — G8427 DOCREV CUR MEDS BY ELIG CLIN: HCPCS | Performed by: SURGERY

## 2017-10-12 PROCEDURE — G8417 CALC BMI ABV UP PARAM F/U: HCPCS | Performed by: SURGERY

## 2017-10-12 PROCEDURE — 1036F TOBACCO NON-USER: CPT | Performed by: SURGERY

## 2017-10-12 ASSESSMENT — ENCOUNTER SYMPTOMS
ABDOMINAL DISTENTION: 0
NAUSEA: 0
VOMITING: 0
SHORTNESS OF BREATH: 0
VOICE CHANGE: 0
COUGH: 0
STRIDOR: 0
BACK PAIN: 1
EYE ITCHING: 0
CHEST TIGHTNESS: 0
DIARRHEA: 0
ABDOMINAL PAIN: 1
RECTAL PAIN: 0
EYE PAIN: 0
CHOKING: 0
EYE REDNESS: 0
ANAL BLEEDING: 0
APNEA: 0
CONSTIPATION: 0
TROUBLE SWALLOWING: 0
BLOOD IN STOOL: 0
WHEEZING: 0
SORE THROAT: 0
SINUS PRESSURE: 0
COLOR CHANGE: 0
EYE DISCHARGE: 0
FACIAL SWELLING: 0
RHINORRHEA: 0
PHOTOPHOBIA: 0

## 2017-10-12 NOTE — TELEPHONE ENCOUNTER
Patient made aware of her apt with Dr. Andre Vega. Pt states she will discuss her questions with dr. Michaela Reeves at her apt today.

## 2017-10-12 NOTE — PATIENT INSTRUCTIONS
Appointment with Dr. Lexie Pantoja on November 1, at 12:45 p.m. She will need to arrive at [facility] 20 minutes early. She will walk in the main entrance of the hospital and report to admitting/registration. Then she will go to Dr. Lexie Pantoja. Please bring your list of medications, photo ID, and insurance cards. If the apt date and time does not work please call [phone] to reschedule.
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
## 2017-10-14 PROBLEM — S01.511A LIP LACERATION: Status: ACTIVE | Noted: 2017-10-14

## 2017-10-14 PROBLEM — S27.321A RIGHT PULMONARY CONTUSION: Status: ACTIVE | Noted: 2017-10-14

## 2017-10-16 ASSESSMENT — ENCOUNTER SYMPTOMS
VOMITING: 0
WHEEZING: 0
COUGH: 0
EYES NEGATIVE: 1
ABDOMINAL PAIN: 0
CONSTIPATION: 0
SORE THROAT: 0
NAUSEA: 0
DIARRHEA: 0
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
## 2017-11-13 LAB
AFB CULTURE & SMEAR: NORMAL
AFB SMEAR: NORMAL

## 2018-01-17 ENCOUNTER — HOSPITAL ENCOUNTER (OUTPATIENT)
Age: 43
Setting detail: SPECIMEN
Discharge: HOME | End: 2018-01-17
Payer: MEDICARE

## 2018-01-17 LAB
BASOPHILS # BLD: 0.8 %
BASOPHILS ABSOLUTE: 0 THOU/MM3 (ref 0–0.1)
EOSINOPHIL # BLD: 3.4 %
EOSINOPHILS ABSOLUTE: 0.2 THOU/MM3 (ref 0–0.4)
HCT VFR BLD CALC: 41 % (ref 37–47)
HEMOGLOBIN: 13.5 GM/DL (ref 12–16)
LYMPHOCYTES # BLD: 27.7 %
LYMPHOCYTES ABSOLUTE: 1.2 THOU/MM3 (ref 1–4.8)
MCH RBC QN AUTO: 28.8 PG (ref 27–31)
MCHC RBC AUTO-ENTMCNC: 33 GM/DL (ref 33–37)
MCV RBC AUTO: 87.4 FL (ref 81–99)
MONOCYTES # BLD: 14.3 %
MONOCYTES ABSOLUTE: 0.6 THOU/MM3 (ref 0.4–1.3)
NUCLEATED RED BLOOD CELLS: 0 /100 WBC
PDW BLD-RTO: 14.5 % (ref 11.5–14.5)
PLATELET # BLD: 221 THOU/MM3 (ref 130–400)
PMV BLD AUTO: 9.4 MCM (ref 7.4–10.4)
RBC # BLD: 4.69 MILL/MM3 (ref 4.2–5.4)
SEG NEUTROPHILS: 53.8 %
SEGMENTED NEUTROPHILS ABSOLUTE COUNT: 2.4 THOU/MM3 (ref 1.8–7.7)
WBC # BLD: 4.5 THOU/MM3 (ref 4.8–10.8)

## 2018-01-17 PROCEDURE — 85025 COMPLETE CBC W/AUTO DIFF WBC: CPT

## 2018-01-18 RX ORDER — M-VIT,TX,IRON,MINS/CALC/FOLIC 27MG-0.4MG
1 TABLET ORAL DAILY
COMMUNITY

## 2018-01-18 NOTE — PROGRESS NOTES
NPO after midnight  [employer] and drivers license  Wear comfortable clean clothing  Do not bring jewelry  Shower night before and morning of surgery with a liquid antibacterial soap  Bring list of medications with dosage and how often taken  Follow all instructions given by your physician   needed at discharge    In preparation for their surgical procedure above patient was screened for Obstructive Sleep Apnea (OTF) using the STOP-Bang Questionnaire by the [place] [address] department. This is a pre-surgical screening tool for patient safety and serves as a recommendation, this WILL NOT cause cancellation of surgery. STOP-Bang Questionnaire  * Do you currently see a pulmonologist?  No     If yes STOP, do not complete. Patient follows with Dr.     1.  Do you snore loudly (able to be heard in the next room)? Yes    2. Do you often feel tired or sleepy during the daytime? No       3. Has anyone ever told you that you stop breathing during your sleep? No    4. Do you have or are you being treated for high blood pressure? No      5. BMI more than 35? BMI (Calculated): 33.4        No    6. Age over 48 years? 38 y.o. No    7. Neck Circumference greater than 17 inches for male or 16 inches for female? Measured           (visits only)            Not Applicable    8. Gender Male? No      TOTAL SCORE: 1    OTF - Low Risk : Yes to 0 - 2 questions  OTF - Intermediate Risk : Yes to 3 - 4 questions  OTF - High Risk : Yes to 5 - 8 questions    Adapted from:   STOP Questionnaire: A Tool to Screen Patients for Obstructive Sleep Apnea   SOFYA OrrC[facility]P.C., CRISTOBAL Mcbride.B.B.S., Melody Kiran M.D., Jerrell Hatch. Carole Coffman, Ph.D., CRISTOBAL Griffith.B.B.S., Connor Orta., Vidhi Rizzo M.D., Amol Hernandez. SOFYA RivasC.P.C.    Anesthesiology 2008; [id number]:901-49 Copyright 2008, the [address] of [employer]est[employer], [address] [place] [address]

## 2018-01-24 ENCOUNTER — ANESTHESIA EVENT (OUTPATIENT)
Dept: OPERATING ROOM | Age: 43
DRG: 519 | End: 2018-01-24
Payer: MEDICARE

## 2018-01-25 ENCOUNTER — HOSPITAL ENCOUNTER (INPATIENT)
Age: 43
LOS: 2 days | Discharge: HOME | DRG: 519 | End: 2018-01-27
Attending: OBSTETRICS & GYNECOLOGY | Admitting: OBSTETRICS & GYNECOLOGY
Payer: MEDICARE

## 2018-01-25 ENCOUNTER — ANESTHESIA (OUTPATIENT)
Dept: OPERATING ROOM | Age: 43
DRG: 519 | End: 2018-01-25
Payer: MEDICARE

## 2018-01-25 VITALS — DIASTOLIC BLOOD PRESSURE: 84 MMHG | OXYGEN SATURATION: 100 % | TEMPERATURE: 95.7 F | SYSTOLIC BLOOD PRESSURE: 130 MMHG

## 2018-01-25 DIAGNOSIS — Z90.710 S/P HYSTERECTOMY: Primary | ICD-10-CM

## 2018-01-25 PROBLEM — D21.9 FIBROIDS: Status: ACTIVE | Noted: 2018-01-25

## 2018-01-25 LAB
HCT VFR BLD CALC: 41.5 % (ref 37–47)
HEMOGLOBIN: 13.9 GM/DL (ref 12–16)
MCH RBC QN AUTO: 29.2 PG (ref 27–31)
MCHC RBC AUTO-ENTMCNC: 33.5 GM/DL (ref 33–37)
MCV RBC AUTO: 87.3 FL (ref 81–99)
PDW BLD-RTO: 14.2 % (ref 11.5–14.5)
PLATELET # BLD: 241 THOU/MM3 (ref 130–400)
PMV BLD AUTO: 8.6 MCM (ref 7.4–10.4)
PREGNANCY, URINE: NEGATIVE
RBC # BLD: 4.75 MILL/MM3 (ref 4.2–5.4)
WBC # BLD: 4.4 THOU/MM3 (ref 4.8–10.8)

## 2018-01-25 PROCEDURE — 2580000003 HC RX 258: Performed by: OBSTETRICS & GYNECOLOGY

## 2018-01-25 PROCEDURE — 3700000000 HC ANESTHESIA ATTENDED CARE: Performed by: OBSTETRICS & GYNECOLOGY

## 2018-01-25 PROCEDURE — 1200000000 HC SEMI PRIVATE

## 2018-01-25 PROCEDURE — 2500000003 HC RX 250 WO HCPCS: Performed by: NURSE ANESTHETIST, CERTIFIED REGISTERED

## 2018-01-25 PROCEDURE — 3600000013 HC SURGERY LEVEL 3 ADDTL 15MIN: Performed by: OBSTETRICS & GYNECOLOGY

## 2018-01-25 PROCEDURE — 6370000000 HC RX 637 (ALT 250 FOR IP)

## 2018-01-25 PROCEDURE — 6370000000 HC RX 637 (ALT 250 FOR IP): Performed by: OBSTETRICS & GYNECOLOGY

## 2018-01-25 PROCEDURE — 81025 URINE PREGNANCY TEST: CPT

## 2018-01-25 PROCEDURE — 7100000001 HC PACU RECOVERY - ADDTL 15 MIN: Performed by: OBSTETRICS & GYNECOLOGY

## 2018-01-25 PROCEDURE — 3600000003 HC SURGERY LEVEL 3 BASE: Performed by: OBSTETRICS & GYNECOLOGY

## 2018-01-25 PROCEDURE — A4450 NON-WATERPROOF TAPE: HCPCS | Performed by: OBSTETRICS & GYNECOLOGY

## 2018-01-25 PROCEDURE — 6360000002 HC RX W HCPCS: Performed by: NURSE ANESTHETIST, CERTIFIED REGISTERED

## 2018-01-25 PROCEDURE — 88307 TISSUE EXAM BY PATHOLOGIST: CPT

## 2018-01-25 PROCEDURE — 6360000002 HC RX W HCPCS

## 2018-01-25 PROCEDURE — 2780000010 HC IMPLANT OTHER: Performed by: OBSTETRICS & GYNECOLOGY

## 2018-01-25 PROCEDURE — 3700000001 HC ADD 15 MINUTES (ANESTHESIA): Performed by: OBSTETRICS & GYNECOLOGY

## 2018-01-25 PROCEDURE — A6252 ABSORPT DRG >16 <=48 W/O BDR: HCPCS | Performed by: OBSTETRICS & GYNECOLOGY

## 2018-01-25 PROCEDURE — 2720000010 HC SURG SUPPLY STERILE: Performed by: OBSTETRICS & GYNECOLOGY

## 2018-01-25 PROCEDURE — 7100000000 HC PACU RECOVERY - FIRST 15 MIN: Performed by: OBSTETRICS & GYNECOLOGY

## 2018-01-25 PROCEDURE — 0UT70ZZ RESECTION OF BILATERAL FALLOPIAN TUBES, OPEN APPROACH: ICD-10-PCS | Performed by: OBSTETRICS & GYNECOLOGY

## 2018-01-25 PROCEDURE — 6360000002 HC RX W HCPCS: Performed by: ANESTHESIOLOGY

## 2018-01-25 PROCEDURE — 85027 COMPLETE CBC AUTOMATED: CPT

## 2018-01-25 PROCEDURE — 6360000002 HC RX W HCPCS: Performed by: OBSTETRICS & GYNECOLOGY

## 2018-01-25 PROCEDURE — 7100000010 HC PHASE II RECOVERY - FIRST 15 MIN: Performed by: OBSTETRICS & GYNECOLOGY

## 2018-01-25 PROCEDURE — 36415 COLL VENOUS BLD VENIPUNCTURE: CPT

## 2018-01-25 PROCEDURE — 0UTC0ZZ RESECTION OF CERVIX, OPEN APPROACH: ICD-10-PCS | Performed by: OBSTETRICS & GYNECOLOGY

## 2018-01-25 PROCEDURE — 7100000011 HC PHASE II RECOVERY - ADDTL 15 MIN: Performed by: OBSTETRICS & GYNECOLOGY

## 2018-01-25 PROCEDURE — 0UT90ZZ RESECTION OF UTERUS, OPEN APPROACH: ICD-10-PCS | Performed by: OBSTETRICS & GYNECOLOGY

## 2018-01-25 RX ORDER — FENTANYL CITRATE 50 UG/ML
INJECTION, SOLUTION INTRAMUSCULAR; INTRAVENOUS PRN
Status: DISCONTINUED | OUTPATIENT
Start: 2018-01-25 | End: 2018-01-25 | Stop reason: SDUPTHER

## 2018-01-25 RX ORDER — OXYCODONE HYDROCHLORIDE AND ACETAMINOPHEN 5; 325 MG/1; MG/1
TABLET ORAL
Status: COMPLETED
Start: 2018-01-25 | End: 2018-01-25

## 2018-01-25 RX ORDER — MEPERIDINE HYDROCHLORIDE 25 MG/ML
12.5 INJECTION INTRAMUSCULAR; INTRAVENOUS; SUBCUTANEOUS EVERY 5 MIN PRN
Status: DISCONTINUED | OUTPATIENT
Start: 2018-01-25 | End: 2018-01-25 | Stop reason: HOSPADM

## 2018-01-25 RX ORDER — SODIUM CHLORIDE 0.9 % (FLUSH) 0.9 %
10 SYRINGE (ML) INJECTION PRN
Status: DISCONTINUED | OUTPATIENT
Start: 2018-01-25 | End: 2018-01-27 | Stop reason: HOSPADM

## 2018-01-25 RX ORDER — MORPHINE SULFATE 2 MG/ML
4 INJECTION, SOLUTION INTRAMUSCULAR; INTRAVENOUS
Status: DISCONTINUED | OUTPATIENT
Start: 2018-01-25 | End: 2018-01-27 | Stop reason: HOSPADM

## 2018-01-25 RX ORDER — DEXAMETHASONE SODIUM PHOSPHATE 4 MG/ML
INJECTION, SOLUTION INTRA-ARTICULAR; INTRALESIONAL; INTRAMUSCULAR; INTRAVENOUS; SOFT TISSUE PRN
Status: DISCONTINUED | OUTPATIENT
Start: 2018-01-25 | End: 2018-01-25 | Stop reason: SDUPTHER

## 2018-01-25 RX ORDER — MORPHINE SULFATE 2 MG/ML
2 INJECTION, SOLUTION INTRAMUSCULAR; INTRAVENOUS EVERY 5 MIN PRN
Status: DISCONTINUED | OUTPATIENT
Start: 2018-01-25 | End: 2018-01-25

## 2018-01-25 RX ORDER — ONDANSETRON 2 MG/ML
8 INJECTION INTRAMUSCULAR; INTRAVENOUS EVERY 8 HOURS PRN
Status: DISCONTINUED | OUTPATIENT
Start: 2018-01-25 | End: 2018-01-25

## 2018-01-25 RX ORDER — MIDAZOLAM HYDROCHLORIDE 1 MG/ML
INJECTION INTRAMUSCULAR; INTRAVENOUS PRN
Status: DISCONTINUED | OUTPATIENT
Start: 2018-01-25 | End: 2018-01-25 | Stop reason: SDUPTHER

## 2018-01-25 RX ORDER — LIDOCAINE HYDROCHLORIDE 20 MG/ML
INJECTION, SOLUTION INFILTRATION; PERINEURAL PRN
Status: DISCONTINUED | OUTPATIENT
Start: 2018-01-25 | End: 2018-01-25 | Stop reason: SDUPTHER

## 2018-01-25 RX ORDER — LABETALOL HYDROCHLORIDE 5 MG/ML
5 INJECTION, SOLUTION INTRAVENOUS EVERY 5 MIN PRN
Status: DISCONTINUED | OUTPATIENT
Start: 2018-01-25 | End: 2018-01-25

## 2018-01-25 RX ORDER — OXYCODONE HYDROCHLORIDE AND ACETAMINOPHEN 5; 325 MG/1; MG/1
2 TABLET ORAL EVERY 4 HOURS PRN
Status: DISCONTINUED | OUTPATIENT
Start: 2018-01-25 | End: 2018-01-27 | Stop reason: HOSPADM

## 2018-01-25 RX ORDER — ACETAMINOPHEN 325 MG/1
650 TABLET ORAL EVERY 4 HOURS PRN
Status: DISCONTINUED | OUTPATIENT
Start: 2018-01-25 | End: 2018-01-27 | Stop reason: HOSPADM

## 2018-01-25 RX ORDER — DOCUSATE SODIUM 100 MG/1
100 CAPSULE, LIQUID FILLED ORAL 2 TIMES DAILY
Status: DISCONTINUED | OUTPATIENT
Start: 2018-01-25 | End: 2018-01-27 | Stop reason: HOSPADM

## 2018-01-25 RX ORDER — SODIUM CHLORIDE, SODIUM LACTATE, POTASSIUM CHLORIDE, CALCIUM CHLORIDE 600; 310; 30; 20 MG/100ML; MG/100ML; MG/100ML; MG/100ML
INJECTION, SOLUTION INTRAVENOUS CONTINUOUS
Status: DISCONTINUED | OUTPATIENT
Start: 2018-01-25 | End: 2018-01-27 | Stop reason: HOSPADM

## 2018-01-25 RX ORDER — OXYCODONE HYDROCHLORIDE AND ACETAMINOPHEN 5; 325 MG/1; MG/1
1 TABLET ORAL EVERY 4 HOURS PRN
Status: DISCONTINUED | OUTPATIENT
Start: 2018-01-25 | End: 2018-01-27 | Stop reason: HOSPADM

## 2018-01-25 RX ORDER — NEOSTIGMINE METHYLSULFATE 1 MG/ML
INJECTION, SOLUTION INTRAVENOUS PRN
Status: DISCONTINUED | OUTPATIENT
Start: 2018-01-25 | End: 2018-01-25 | Stop reason: SDUPTHER

## 2018-01-25 RX ORDER — KETOROLAC TROMETHAMINE 30 MG/ML
INJECTION, SOLUTION INTRAMUSCULAR; INTRAVENOUS
Status: COMPLETED
Start: 2018-01-25 | End: 2018-01-25

## 2018-01-25 RX ORDER — SODIUM CHLORIDE 0.9 % (FLUSH) 0.9 %
10 SYRINGE (ML) INJECTION EVERY 12 HOURS SCHEDULED
Status: DISCONTINUED | OUTPATIENT
Start: 2018-01-25 | End: 2018-01-27 | Stop reason: HOSPADM

## 2018-01-25 RX ORDER — ONDANSETRON 2 MG/ML
4 INJECTION INTRAMUSCULAR; INTRAVENOUS EVERY 6 HOURS PRN
Status: DISCONTINUED | OUTPATIENT
Start: 2018-01-25 | End: 2018-01-27 | Stop reason: HOSPADM

## 2018-01-25 RX ORDER — ONDANSETRON 2 MG/ML
4 INJECTION INTRAMUSCULAR; INTRAVENOUS
Status: DISCONTINUED | OUTPATIENT
Start: 2018-01-25 | End: 2018-01-25

## 2018-01-25 RX ORDER — GLYCOPYRROLATE 0.2 MG/ML
INJECTION INTRAMUSCULAR; INTRAVENOUS PRN
Status: DISCONTINUED | OUTPATIENT
Start: 2018-01-25 | End: 2018-01-25 | Stop reason: SDUPTHER

## 2018-01-25 RX ORDER — FENTANYL CITRATE 50 UG/ML
50 INJECTION, SOLUTION INTRAMUSCULAR; INTRAVENOUS EVERY 5 MIN PRN
Status: COMPLETED | OUTPATIENT
Start: 2018-01-25 | End: 2018-01-25

## 2018-01-25 RX ORDER — ROCURONIUM BROMIDE 10 MG/ML
INJECTION, SOLUTION INTRAVENOUS PRN
Status: DISCONTINUED | OUTPATIENT
Start: 2018-01-25 | End: 2018-01-25 | Stop reason: SDUPTHER

## 2018-01-25 RX ORDER — DIPHENHYDRAMINE HYDROCHLORIDE 50 MG/ML
12.5 INJECTION INTRAMUSCULAR; INTRAVENOUS
Status: DISCONTINUED | OUTPATIENT
Start: 2018-01-25 | End: 2018-01-25

## 2018-01-25 RX ORDER — PROPOFOL 10 MG/ML
INJECTION, EMULSION INTRAVENOUS PRN
Status: DISCONTINUED | OUTPATIENT
Start: 2018-01-25 | End: 2018-01-25 | Stop reason: SDUPTHER

## 2018-01-25 RX ORDER — MORPHINE SULFATE 2 MG/ML
2 INJECTION, SOLUTION INTRAMUSCULAR; INTRAVENOUS
Status: DISCONTINUED | OUTPATIENT
Start: 2018-01-25 | End: 2018-01-27 | Stop reason: HOSPADM

## 2018-01-25 RX ORDER — KETOROLAC TROMETHAMINE 30 MG/ML
30 INJECTION, SOLUTION INTRAMUSCULAR; INTRAVENOUS EVERY 6 HOURS PRN
Status: DISCONTINUED | OUTPATIENT
Start: 2018-01-25 | End: 2018-01-27 | Stop reason: HOSPADM

## 2018-01-25 RX ORDER — SODIUM CHLORIDE, SODIUM LACTATE, POTASSIUM CHLORIDE, CALCIUM CHLORIDE 600; 310; 30; 20 MG/100ML; MG/100ML; MG/100ML; MG/100ML
INJECTION, SOLUTION INTRAVENOUS CONTINUOUS
Status: DISCONTINUED | OUTPATIENT
Start: 2018-01-25 | End: 2018-01-25

## 2018-01-25 RX ORDER — HYDRALAZINE HYDROCHLORIDE 20 MG/ML
5 INJECTION INTRAMUSCULAR; INTRAVENOUS EVERY 10 MIN PRN
Status: DISCONTINUED | OUTPATIENT
Start: 2018-01-25 | End: 2018-01-25

## 2018-01-25 RX ORDER — KETOROLAC TROMETHAMINE 30 MG/ML
INJECTION, SOLUTION INTRAMUSCULAR; INTRAVENOUS PRN
Status: DISCONTINUED | OUTPATIENT
Start: 2018-01-25 | End: 2018-01-25 | Stop reason: SDUPTHER

## 2018-01-25 RX ORDER — ONDANSETRON 2 MG/ML
INJECTION INTRAMUSCULAR; INTRAVENOUS PRN
Status: DISCONTINUED | OUTPATIENT
Start: 2018-01-25 | End: 2018-01-25 | Stop reason: SDUPTHER

## 2018-01-25 RX ADMIN — KETOROLAC TROMETHAMINE 30 MG: 30 INJECTION, SOLUTION INTRAMUSCULAR at 14:29

## 2018-01-25 RX ADMIN — SODIUM CHLORIDE, POTASSIUM CHLORIDE, SODIUM LACTATE AND CALCIUM CHLORIDE: 600; 310; 30; 20 INJECTION, SOLUTION INTRAVENOUS at 06:34

## 2018-01-25 RX ADMIN — FENTANYL CITRATE 50 MCG: 50 INJECTION INTRAMUSCULAR; INTRAVENOUS at 10:20

## 2018-01-25 RX ADMIN — Medication 50 MG: at 07:44

## 2018-01-25 RX ADMIN — SODIUM CHLORIDE, POTASSIUM CHLORIDE, SODIUM LACTATE AND CALCIUM CHLORIDE: 600; 310; 30; 20 INJECTION, SOLUTION INTRAVENOUS at 12:46

## 2018-01-25 RX ADMIN — FENTANYL CITRATE 50 MCG: 50 INJECTION INTRAMUSCULAR; INTRAVENOUS at 09:50

## 2018-01-25 RX ADMIN — FENTANYL CITRATE 50 MCG: 50 INJECTION INTRAMUSCULAR; INTRAVENOUS at 08:55

## 2018-01-25 RX ADMIN — KETOROLAC TROMETHAMINE 30 MG: 30 INJECTION, SOLUTION INTRAMUSCULAR at 21:11

## 2018-01-25 RX ADMIN — OXYCODONE HYDROCHLORIDE AND ACETAMINOPHEN 2 TABLET: 5; 325 TABLET ORAL at 12:18

## 2018-01-25 RX ADMIN — HYDROMORPHONE HYDROCHLORIDE 0.5 MG: 1 INJECTION, SOLUTION INTRAMUSCULAR; INTRAVENOUS; SUBCUTANEOUS at 11:00

## 2018-01-25 RX ADMIN — HYDROMORPHONE HYDROCHLORIDE 0.5 MG: 1 INJECTION, SOLUTION INTRAMUSCULAR; INTRAVENOUS; SUBCUTANEOUS at 10:45

## 2018-01-25 RX ADMIN — DOCUSATE SODIUM 100 MG: 100 CAPSULE ORAL at 21:11

## 2018-01-25 RX ADMIN — HYDROMORPHONE HYDROCHLORIDE 0.5 MG: 1 INJECTION, SOLUTION INTRAMUSCULAR; INTRAVENOUS; SUBCUTANEOUS at 10:53

## 2018-01-25 RX ADMIN — GLYCOPYRROLATE 0.2 MG: 0.2 INJECTION, SOLUTION INTRAMUSCULAR; INTRAVENOUS at 07:44

## 2018-01-25 RX ADMIN — FENTANYL CITRATE 50 MCG: 50 INJECTION INTRAMUSCULAR; INTRAVENOUS at 10:15

## 2018-01-25 RX ADMIN — PROPOFOL 200 MG: 10 INJECTION, EMULSION INTRAVENOUS at 07:44

## 2018-01-25 RX ADMIN — SODIUM CHLORIDE, POTASSIUM CHLORIDE, SODIUM LACTATE AND CALCIUM CHLORIDE: 600; 310; 30; 20 INJECTION, SOLUTION INTRAVENOUS at 21:11

## 2018-01-25 RX ADMIN — CEFAZOLIN SODIUM 2 G: 2 SOLUTION INTRAVENOUS at 07:46

## 2018-01-25 RX ADMIN — GLYCOPYRROLATE 0.6 MG: 0.2 INJECTION, SOLUTION INTRAMUSCULAR; INTRAVENOUS at 09:15

## 2018-01-25 RX ADMIN — FENTANYL CITRATE 50 MCG: 50 INJECTION INTRAMUSCULAR; INTRAVENOUS at 09:55

## 2018-01-25 RX ADMIN — MIDAZOLAM HYDROCHLORIDE 2 MG: 1 INJECTION, SOLUTION INTRAMUSCULAR; INTRAVENOUS at 07:40

## 2018-01-25 RX ADMIN — Medication 10 ML: at 21:13

## 2018-01-25 RX ADMIN — OXYCODONE HYDROCHLORIDE AND ACETAMINOPHEN 2 TABLET: 5; 325 TABLET ORAL at 16:24

## 2018-01-25 RX ADMIN — SODIUM CHLORIDE, POTASSIUM CHLORIDE, SODIUM LACTATE AND CALCIUM CHLORIDE: 600; 310; 30; 20 INJECTION, SOLUTION INTRAVENOUS at 09:10

## 2018-01-25 RX ADMIN — KETOROLAC TROMETHAMINE 30 MG: 30 INJECTION, SOLUTION INTRAMUSCULAR; INTRAVENOUS at 09:34

## 2018-01-25 RX ADMIN — HYDROMORPHONE HYDROCHLORIDE 0.5 MG: 1 INJECTION, SOLUTION INTRAMUSCULAR; INTRAVENOUS; SUBCUTANEOUS at 10:30

## 2018-01-25 RX ADMIN — MORPHINE SULFATE 4 MG: 2 INJECTION, SOLUTION INTRAMUSCULAR; INTRAVENOUS at 17:58

## 2018-01-25 RX ADMIN — FENTANYL CITRATE 100 MCG: 50 INJECTION INTRAMUSCULAR; INTRAVENOUS at 07:44

## 2018-01-25 RX ADMIN — ONDANSETRON 4 MG: 2 INJECTION INTRAMUSCULAR; INTRAVENOUS at 09:00

## 2018-01-25 RX ADMIN — Medication 20 MG: at 08:20

## 2018-01-25 RX ADMIN — LIDOCAINE HYDROCHLORIDE 100 MG: 20 INJECTION, SOLUTION INFILTRATION; PERINEURAL at 07:44

## 2018-01-25 RX ADMIN — MORPHINE SULFATE 2 MG: 2 INJECTION, SOLUTION INTRAMUSCULAR; INTRAVENOUS at 21:11

## 2018-01-25 RX ADMIN — DEXAMETHASONE SODIUM PHOSPHATE 10 MG: 4 INJECTION, SOLUTION INTRAMUSCULAR; INTRAVENOUS at 07:44

## 2018-01-25 RX ADMIN — NEOSTIGMINE METHYLSULFATE 3 MG: 1 INJECTION, SOLUTION INTRAVENOUS at 09:15

## 2018-01-25 ASSESSMENT — PULMONARY FUNCTION TESTS
PIF_VALUE: 25
PIF_VALUE: 24
PIF_VALUE: 20
PIF_VALUE: 19
PIF_VALUE: 24
PIF_VALUE: 22
PIF_VALUE: 2
PIF_VALUE: 25
PIF_VALUE: 19
PIF_VALUE: 25
PIF_VALUE: 25
PIF_VALUE: 19
PIF_VALUE: 25
PIF_VALUE: 21
PIF_VALUE: 24
PIF_VALUE: 21
PIF_VALUE: 15
PIF_VALUE: 2
PIF_VALUE: 19
PIF_VALUE: 24
PIF_VALUE: 25
PIF_VALUE: 24
PIF_VALUE: 13
PIF_VALUE: 23
PIF_VALUE: 25
PIF_VALUE: 19
PIF_VALUE: 2
PIF_VALUE: 25
PIF_VALUE: 24
PIF_VALUE: 24
PIF_VALUE: 25
PIF_VALUE: 25
PIF_VALUE: 23
PIF_VALUE: 27
PIF_VALUE: 22
PIF_VALUE: 22
PIF_VALUE: 2
PIF_VALUE: 24
PIF_VALUE: 19
PIF_VALUE: 2
PIF_VALUE: 22
PIF_VALUE: 25
PIF_VALUE: 27
PIF_VALUE: 24
PIF_VALUE: 24
PIF_VALUE: 25
PIF_VALUE: 24
PIF_VALUE: 21
PIF_VALUE: 26
PIF_VALUE: 22
PIF_VALUE: 19
PIF_VALUE: 20
PIF_VALUE: 19
PIF_VALUE: 24
PIF_VALUE: 25
PIF_VALUE: 24
PIF_VALUE: 23
PIF_VALUE: 21
PIF_VALUE: 25
PIF_VALUE: 25
PIF_VALUE: 15
PIF_VALUE: 2
PIF_VALUE: 25
PIF_VALUE: 20
PIF_VALUE: 5
PIF_VALUE: 24
PIF_VALUE: 19
PIF_VALUE: 22
PIF_VALUE: 1
PIF_VALUE: 2
PIF_VALUE: 0
PIF_VALUE: 21
PIF_VALUE: 1
PIF_VALUE: 25
PIF_VALUE: 19
PIF_VALUE: 25
PIF_VALUE: 2
PIF_VALUE: 20
PIF_VALUE: 20
PIF_VALUE: 19
PIF_VALUE: 26
PIF_VALUE: 25
PIF_VALUE: 26
PIF_VALUE: 25
PIF_VALUE: 24
PIF_VALUE: 25
PIF_VALUE: 2
PIF_VALUE: 15
PIF_VALUE: 1
PIF_VALUE: 16
PIF_VALUE: 22
PIF_VALUE: 19
PIF_VALUE: 3
PIF_VALUE: 23
PIF_VALUE: 23
PIF_VALUE: 25
PIF_VALUE: 2
PIF_VALUE: 22
PIF_VALUE: 2
PIF_VALUE: 19
PIF_VALUE: 19
PIF_VALUE: 0
PIF_VALUE: 23
PIF_VALUE: 20
PIF_VALUE: 25
PIF_VALUE: 1
PIF_VALUE: 18
PIF_VALUE: 3
PIF_VALUE: 25
PIF_VALUE: 21
PIF_VALUE: 20
PIF_VALUE: 19
PIF_VALUE: 2
PIF_VALUE: 25
PIF_VALUE: 22
PIF_VALUE: 19
PIF_VALUE: 21
PIF_VALUE: 1
PIF_VALUE: 22
PIF_VALUE: 19
PIF_VALUE: 25
PIF_VALUE: 20
PIF_VALUE: 25

## 2018-01-25 ASSESSMENT — PAIN SCALES - GENERAL
PAINLEVEL_OUTOF10: 9
PAINLEVEL_OUTOF10: 10
PAINLEVEL_OUTOF10: 10
PAINLEVEL_OUTOF10: 3
PAINLEVEL_OUTOF10: 9
PAINLEVEL_OUTOF10: 10
PAINLEVEL_OUTOF10: 9
PAINLEVEL_OUTOF10: 8
PAINLEVEL_OUTOF10: 8
PAINLEVEL_OUTOF10: 10
PAINLEVEL_OUTOF10: 9
PAINLEVEL_OUTOF10: 8
PAINLEVEL_OUTOF10: 10
PAINLEVEL_OUTOF10: 10
PAINLEVEL_OUTOF10: 9
PAINLEVEL_OUTOF10: 9
PAINLEVEL_OUTOF10: 8
PAINLEVEL_OUTOF10: 10
PAINLEVEL_OUTOF10: 6

## 2018-01-25 ASSESSMENT — PAIN DESCRIPTION - DESCRIPTORS
DESCRIPTORS: CRAMPING
DESCRIPTORS: ACHING;SHARP
DESCRIPTORS: ACHING

## 2018-01-25 ASSESSMENT — PAIN DESCRIPTION - ORIENTATION
ORIENTATION: LOWER
ORIENTATION: LOWER

## 2018-01-25 ASSESSMENT — PAIN DESCRIPTION - PAIN TYPE
TYPE: SURGICAL PAIN
TYPE: SURGICAL PAIN

## 2018-01-25 ASSESSMENT — PAIN DESCRIPTION - LOCATION
LOCATION: ABDOMEN
LOCATION: ABDOMEN
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
## 2018-01-25 NOTE — ANESTHESIA POSTPROCEDURE EVALUATION
Department of Anesthesiology  Postprocedure Note    Patient: Nicholas Chaney  MRN: [id number]  YOB: 1975  Date of evaluation: 1/25/2018  Time:  1:47 PM     Procedure Summary     Date:  01/25/18 Room / Location:  [address] [place]    Anesthesia Start:  8184 Anesthesia Stop:  Karen Douse    Procedure:  TOTAL ABDOMINAL HYSTERECTOMY WITH BILATERAL SALPINGECTOMY (Bilateral ) Diagnosis:  (FIBROIDS)    Surgeon:  Nora Keen MD Responsible Provider:  Miguel A Mcdaniels MD    Anesthesia Type:  general ASA Status:  2          Anesthesia Type: general    Nickolas Phase I: Nickolas Score: 8    Nickolas Phase II: Nickolas Score: 9    Last vitals: Reviewed and per EMR flowsheets. Anesthesia Post Evaluation   [address] [facility]  POST-ANESTHESIA NOTE       Name:  Nicholas Chaney                                         Age:  38 y.o.   MRN:  [id number]      Last Vitals:  BP (!) 138/91   Pulse 77   Temp 98 °F (36.7 °C)   Resp 16   Ht 5' 2\" (1.575 m)   Wt 186 lb (84.4 kg)   LMP 12/27/2017   SpO2 100%   BMI 34.02 kg/m²   Patient Vitals for the past 4 hrs:   BP Temp Temp src Pulse Resp SpO2   01/25/18 1244 (!) 138/91 - - 77 16 100 %   01/25/18 1215 121/73 98 °F (36.7 °C) - 84 16 100 %   01/25/18 1146 135/75 97.4 °F (36.3 °C) Oral 64 20 100 %   01/25/18 1130 132/78 - - 76 10 98 %   01/25/18 1125 133/81 - - 74 12 99 %   01/25/18 1120 125/81 - - 66 9 (!) 88 %   01/25/18 1115 121/73 - - 76 8 93 %   01/25/18 1110 129/79 - - 69 8 93 %   01/25/18 1105 127/76 - - 68 (!) 7 91 %   01/25/18 1100 133/82 - - 70 13 98 %   01/25/18 1055 130/76 - - 74 16 98 %   01/25/18 1050 135/78 - - 74 15 99 %   01/25/18 1045 122/86 - - 69 13 96 %   01/25/18 1040 120/82 - - 78 15 91 %   01/25/18 1035 128/86 - - 74 10 96 %   01/25/18 1030 124/84 - - 73 13 97 %   01/25/18 1025 133/84 - - 71 13 97 %   01/25/18 1020 127/83 - - 69 10 97 %   01/25/18 1015 125/81 - - 69 10 100 %   01/25/18 1010 120/79 - - 70 13 100 %   01/25/18 1005 123/81 - - 72

## 2018-01-25 NOTE — PROGRESS NOTES
Pt returned to [facility] room 20. Alert and oriented. Family and pastoral care in room. Patient requesting sierrah mist and karis.
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
## 2018-01-25 NOTE — H&P
Depression     Disease of blood and blood forming organ     low iron    Patient is Religious        Past Surgical History:   Past Surgical History:   Procedure Laterality Date    TUBAL LIGATION         Social History:   History   Smoking Status    Never Smoker   Smokeless Tobacco    Never Used        Family History: Noncontributory; Denies h/o cancer. ROS:  Negative except as stated in HPI    PE:  Vitals:    18 0552   BP: 124/78   Pulse: 85   Resp: 16   Temp: 99 °F (37.2 °C)   SpO2: 100%       General: well nourished, well developed, in no acute distres  Resp: breathing unlabored  Abdomen: Nontender, no rebound, no guarding  Pelvic: deferred to the OR    Labs:   Lab Results   Component Value Date    WBC 4.4 (L) 2018    HGB 13.9 2018    HCT 41.5 2018    MCV 87.3 2018     2018: pap: negative; EMB: negative for hyperplasia/malignancy  Imagin17 u/s:   TECHNICAL DATA:    Uterus - 14.0 x 7.3 x 9.7 cm   Left Ovary - 2.7 x 2.6 x 1.4 cm           FINDINGS:    UTERUS:    1. Enlarged uterus measuring 14.0 x 7.3 x 9.7 cm.       MYOMETRIUM:    1. Extremely heterogeneous myometrial echogenicity which can be associated with adenomyosis. 2. There is a 3.3 x 3.4 x 3.4 cm hypoechoic solid mass projecting from the uterine fundus to the left of midline consistent with a subserosal fibroid. 3. There is a 2.7 x 2.4 x 3.0 cm hypoechoic solid mass projecting off the posterior aspect of the uterine body to the left of midline consistent with a subserosal fibroid.       ENDOMETRIUM:    1. The endometrium is not adequately visualized. 2. There is fluid within the endocervical canal which is complicated with low-level internal echoes.       RIGHT OVARY/ADNEXA:    1. The right ovary is not identified. 2. There is no right adnexal mass.       LEFT OVARY/ADNEXA:    1. The left ovary is normal size and echogenicity.    2. There are a few small follicles

## 2018-01-25 NOTE — PROGRESS NOTES
Pt resting in bed eyes closed. Family at bedside. Warm blanket given to pt.  Advised will return around [phone]

## 2018-01-25 NOTE — PROGRESS NOTES
0944  Pt. Unresponsive on adm. To pacu. oralairway and o2 per simple mask intact. Abdominal dressing intact and dry. Ice pack applied to abdomen. peripad intact with scant amt. Drainage. 0947  Pt. Reacting, restless, grimmacing, and rubbing abdomen. oralairway removed. o2 discontinued. 0950  Pt. Awake and oriented. Pt. Medicated for pain 9 out of 10.  1000  Pt. Resting quietly with eyes closed. 1015  Pt. Awake and grimmacing. Pt. States pain is the same. Continue to medicate. 1025  Pt. Resting quietly with eyes closed. No grimmacing note. 1030  Pt. Awake. When asked if pain was getting better, pt. nodds head \"yes\". 1035 pt. Resting quietly with eyes closed. 1045  Pt. Awake, grimmacing and rubbing abdomen. Pt. Asking for more pain medication. Continue to medicate. 1053  Pt. Continues to rub abdomen and now states pain is 10 out of 10. Continue to medicate. 1105  Pt. Resting soundly with eyes closed. respers < 10, o2 sat 91%. 1115  Pt. Responds easily to name. respers remain < 10. Pt. Continues to complain of pain while awake. Pt. Encouraged to deep breathe. 1120  Pt. Resting soundly with eyes closed. o2 sat 88-90%. o2 per nasal cannula applied at 2 liters. 1130  Pt. Continues to rest soundly with eyes closed. 1135  pacu criteria met. Transfer to [facility]. Family in room.

## 2018-01-25 NOTE — PROGRESS NOTES
Patient arrived to 3G04 by cart. Patient able to transfer self into bed. States pain 9/10. Denies nausea. Abdominal incision dry and intact. Abdominal binder in place. Ice pack over incision. Amira pad present with scant drainage. Driver in place with stat lock to right leg, with about 100 ml of yellow urine in bag. Lungs clear. Hypo bowel sounds. LR infusing into left hand at 125ml/hr, 400 ml left to infuse. INT present in right hand. SCD's on bilaterally. Mother at bedside. Oriented to room. Call light within reach. Bed alarm turned on. Care board updated and reviewed with patient.

## 2018-01-26 LAB
HCT VFR BLD CALC: 36 % (ref 37–47)
HEMOGLOBIN: 12.2 GM/DL (ref 12–16)
MCH RBC QN AUTO: 29.7 PG (ref 27–31)
MCHC RBC AUTO-ENTMCNC: 33.9 GM/DL (ref 33–37)
MCV RBC AUTO: 87.7 FL (ref 81–99)
PDW BLD-RTO: 14.4 % (ref 11.5–14.5)
PLATELET # BLD: 220 THOU/MM3 (ref 130–400)
PMV BLD AUTO: 8.7 MCM (ref 7.4–10.4)
RBC # BLD: 4.1 MILL/MM3 (ref 4.2–5.4)
WBC # BLD: 7.7 THOU/MM3 (ref 4.8–10.8)

## 2018-01-26 PROCEDURE — 6360000002 HC RX W HCPCS: Performed by: OBSTETRICS & GYNECOLOGY

## 2018-01-26 PROCEDURE — 85027 COMPLETE CBC AUTOMATED: CPT

## 2018-01-26 PROCEDURE — 6370000000 HC RX 637 (ALT 250 FOR IP): Performed by: OBSTETRICS & GYNECOLOGY

## 2018-01-26 PROCEDURE — 1200000000 HC SEMI PRIVATE

## 2018-01-26 PROCEDURE — 36415 COLL VENOUS BLD VENIPUNCTURE: CPT

## 2018-01-26 PROCEDURE — 2580000003 HC RX 258: Performed by: OBSTETRICS & GYNECOLOGY

## 2018-01-26 RX ORDER — OXYCODONE HYDROCHLORIDE AND ACETAMINOPHEN 5; 325 MG/1; MG/1
1 TABLET ORAL EVERY 4 HOURS PRN
Qty: 28 TABLET | Refills: 0 | Status: SHIPPED | OUTPATIENT
Start: 2018-01-26 | End: 2018-02-01

## 2018-01-26 RX ORDER — IBUPROFEN 600 MG/1
600 TABLET ORAL EVERY 6 HOURS PRN
Qty: 30 TABLET | Refills: 1 | COMMUNITY
Start: 2018-01-26 | End: 2018-08-21

## 2018-01-26 RX ADMIN — KETOROLAC TROMETHAMINE 30 MG: 30 INJECTION, SOLUTION INTRAMUSCULAR at 18:54

## 2018-01-26 RX ADMIN — DOCUSATE SODIUM 100 MG: 100 CAPSULE ORAL at 13:10

## 2018-01-26 RX ADMIN — KETOROLAC TROMETHAMINE 30 MG: 30 INJECTION, SOLUTION INTRAMUSCULAR at 13:10

## 2018-01-26 RX ADMIN — Medication 10 ML: at 22:21

## 2018-01-26 RX ADMIN — OXYCODONE HYDROCHLORIDE AND ACETAMINOPHEN 1 TABLET: 5; 325 TABLET ORAL at 14:13

## 2018-01-26 RX ADMIN — SODIUM CHLORIDE, POTASSIUM CHLORIDE, SODIUM LACTATE AND CALCIUM CHLORIDE: 600; 310; 30; 20 INJECTION, SOLUTION INTRAVENOUS at 07:12

## 2018-01-26 RX ADMIN — DOCUSATE SODIUM 100 MG: 100 CAPSULE ORAL at 22:21

## 2018-01-26 RX ADMIN — MORPHINE SULFATE 2 MG: 2 INJECTION, SOLUTION INTRAMUSCULAR; INTRAVENOUS at 07:09

## 2018-01-26 RX ADMIN — KETOROLAC TROMETHAMINE 30 MG: 30 INJECTION, SOLUTION INTRAMUSCULAR at 07:09

## 2018-01-26 ASSESSMENT — PAIN DESCRIPTION - PAIN TYPE
TYPE: SURGICAL PAIN
TYPE: ACUTE PAIN;SURGICAL PAIN

## 2018-01-26 ASSESSMENT — PAIN DESCRIPTION - LOCATION
LOCATION: ABDOMEN

## 2018-01-26 ASSESSMENT — PAIN SCALES - GENERAL
PAINLEVEL_OUTOF10: 2
PAINLEVEL_OUTOF10: 2
PAINLEVEL_OUTOF10: 0
PAINLEVEL_OUTOF10: 5
PAINLEVEL_OUTOF10: 5
PAINLEVEL_OUTOF10: 6
PAINLEVEL_OUTOF10: 7
PAINLEVEL_OUTOF10: 6

## 2018-01-26 ASSESSMENT — PAIN DESCRIPTION - DESCRIPTORS
DESCRIPTORS: DISCOMFORT
DESCRIPTORS: ACHING;DISCOMFORT
DESCRIPTORS: ACHING
DESCRIPTORS: DISCOMFORT

## 2018-01-26 ASSESSMENT — PAIN DESCRIPTION - ORIENTATION
ORIENTATION: LOWER
ORIENTATION: LOWER

## 2018-01-26 NOTE — FLOWSHEET NOTE
Patient is in bed. I met her mother and there were three other ladies from her Mu-ism there. Patient did not want prayer but did accept our card. Spiritual care will continue to follow as needed. 01/26/18 1200   Encounter Summary   Services provided to: Patient and family together   Referral/Consult From: [address] [place] [address] Parent; Yazidi/indira community   [facility]   Contact Yazidi Completed   Continue Visiting Yes  (1/26)   Complexity of Encounter Moderate   Length of Encounter 15 minutes   Routine   Type Initial   Assessment Approachable;Calm   Intervention Nurtured hope   Outcome Coping;Comfort;Expressed gratitude   Left our card with our hours on it as well as the poem. \"footprints in the sand. \"

## 2018-01-26 NOTE — PROGRESS NOTES
Patient showered, up to bathroom, not passing gas yet, scant/small bloody vaginal drainage this shift. Principal Discharge DX:	Alcoholic intoxication with complication Principal Discharge DX:	Alcohol intoxication

## 2018-01-27 VITALS
DIASTOLIC BLOOD PRESSURE: 85 MMHG | HEIGHT: 62 IN | TEMPERATURE: 98.2 F | SYSTOLIC BLOOD PRESSURE: 132 MMHG | HEART RATE: 85 BPM | BODY MASS INDEX: 34.23 KG/M2 | OXYGEN SATURATION: 93 % | WEIGHT: 186 LBS | RESPIRATION RATE: 18 BRPM

## 2018-01-27 LAB
HCT VFR BLD CALC: 34.9 % (ref 37–47)
HEMOGLOBIN: 11.6 GM/DL (ref 12–16)
MCH RBC QN AUTO: 29.5 PG (ref 27–31)
MCHC RBC AUTO-ENTMCNC: 33.2 GM/DL (ref 33–37)
MCV RBC AUTO: 88.8 FL (ref 81–99)
PDW BLD-RTO: 14.6 % (ref 11.5–14.5)
PLATELET # BLD: 205 THOU/MM3 (ref 130–400)
PMV BLD AUTO: 8.9 MCM (ref 7.4–10.4)
RBC # BLD: 3.93 MILL/MM3 (ref 4.2–5.4)
WBC # BLD: 5.4 THOU/MM3 (ref 4.8–10.8)

## 2018-01-27 PROCEDURE — 6360000002 HC RX W HCPCS: Performed by: OBSTETRICS & GYNECOLOGY

## 2018-01-27 PROCEDURE — 6370000000 HC RX 637 (ALT 250 FOR IP): Performed by: OBSTETRICS & GYNECOLOGY

## 2018-01-27 PROCEDURE — 85027 COMPLETE CBC AUTOMATED: CPT

## 2018-01-27 PROCEDURE — 2580000003 HC RX 258: Performed by: OBSTETRICS & GYNECOLOGY

## 2018-01-27 PROCEDURE — 36415 COLL VENOUS BLD VENIPUNCTURE: CPT

## 2018-01-27 RX ADMIN — Medication 10 ML: at 08:01

## 2018-01-27 RX ADMIN — KETOROLAC TROMETHAMINE 30 MG: 30 INJECTION, SOLUTION INTRAMUSCULAR at 02:44

## 2018-01-27 RX ADMIN — DOCUSATE SODIUM 100 MG: 100 CAPSULE ORAL at 08:01

## 2018-01-27 RX ADMIN — OXYCODONE HYDROCHLORIDE AND ACETAMINOPHEN 2 TABLET: 5; 325 TABLET ORAL at 02:41

## 2018-01-27 ASSESSMENT — PAIN DESCRIPTION - PAIN TYPE
TYPE: SURGICAL PAIN

## 2018-01-27 ASSESSMENT — PAIN SCALES - GENERAL
PAINLEVEL_OUTOF10: 7
PAINLEVEL_OUTOF10: 0
PAINLEVEL_OUTOF10: 3
PAINLEVEL_OUTOF10: 7
PAINLEVEL_OUTOF10: 0

## 2018-01-27 ASSESSMENT — PAIN DESCRIPTION - LOCATION
LOCATION: ABDOMEN

## 2018-01-27 NOTE — PLAN OF CARE
Problem: Pain:  Goal: Pain level will decrease  Pain level will decrease   Outcome: Ongoing  Patient states that pain is 7 out of 10 on lorraine scale. Pain medication given. Patient resting quietly in bed. Goal: Control of acute pain  Control of acute pain   Outcome: Ongoing  Patient acute pain is controlled with pain medication    Problem: Pain Control  Goal: Improvement in pain related behaviors BP/HR WNL  Outcome: Ongoing  Patient's vitals are within normal range    Problem: Respiratory  Goal: No pulmonary complications  Outcome: Ongoing  Patient lung sounds are clear    Problem: GI  Goal: No bowel complications  Outcome: Ongoing  Patient is passing a little gas, no bowel movement, stool softener given    Problem:   Goal: Adequate urinary output  Outcome: Ongoing  Patient is voiding quantity sufficient    Problem: Skin Integrity/Risk  Goal: Wound healing  Outcome: Ongoing  Patient incision site has dressing that is dry and intact    Problem: SAFETY  Goal: Free from accidental physical injury  Outcome: Ongoing  Patient is free from accidental injury, up with assistance, skid proof footwear on while up. Alert and oriented. Problem: DISCHARGE BARRIERS  Goal: Patient's continuum of care needs are met  Outcome: Ongoing  Patient to be discharged to home    Problem: Discharge Planning:  Goal: Discharged to appropriate level of care  Discharged to appropriate level of care   Outcome: Ongoing  Patient to be discharged to home when able    Comments: Care plan reviewed with patient. Patient verbalize understanding of the plan of care and contribute to goal setting.

## 2018-01-27 NOTE — DISCHARGE SUMMARY
Gynecology Discharge Summary        Pt Name: Alida Valdes  MRN: [id number] [place] #: [de-identified]  YOB: 1975      Reasons for Admission on 1/25/2018  5:26 AM  Fibroids [D25.9]  S/P hysterectomy [Z90.710]  No comment available      PROCEDURE:  LORETTA    Operative Complications       Post Operative Labs    Lab Results   Component Value Date    WBC 5.4 01/27/2018    HGB 11.6 01/27/2018    HCT 34.9 01/27/2018     01/27/2018       Discharge Diagnosis       Discharge Information  Current Discharge Medication List      START taking these medications    Details   oxyCODONE-acetaminophen (PERCOCET) 5-325 MG per tablet Take 1 tablet by mouth every 4 hours as needed for Pain for up to 7 days. Qty: 28 tablet, Refills: 0    Associated Diagnoses: S/P hysterectomy      ibuprofen (ADVIL;MOTRIN) 600 MG tablet Take 1 tablet by mouth every 6 hours as needed for Pain  Qty: 30 tablet, Refills: 1         CONTINUE these medications which have NOT CHANGED    Details   Multiple Vitamins-Minerals (THERAPEUTIC MULTIVITAMIN-MINERALS) tablet Take 1 tablet by mouth daily      Acetaminophen-Caff-Pyrilamine 500-60-15 MG TABS Take 1 tablet by mouth 2 times daily as needed ( OTC  Midol )      VENTOLIN  (90 Base) MCG/ACT inhaler INL 2 PFS PO Q 2 TO 4 H PRF SOB PRN  Refills: 0         STOP taking these medications       ferrous sulfate 325 (65 Fe) MG tablet Comments:   Reason for Stopping:               No discharge procedures on file. Diet regular  Discharge to:  home  Follow up in 1-2 wks.   Jett Cr 1/27/2018 9:07 AM

## 2018-01-29 ENCOUNTER — TELEPHONE (OUTPATIENT)
Dept: FAMILY MEDICINE CLINIC | Age: 43
End: 2018-01-29

## 2018-02-01 ENCOUNTER — OFFICE VISIT (OUTPATIENT)
Dept: FAMILY MEDICINE CLINIC | Age: 43
End: 2018-02-01
Payer: MEDICARE

## 2018-02-01 VITALS
RESPIRATION RATE: 18 BRPM | HEIGHT: 62 IN | SYSTOLIC BLOOD PRESSURE: 122 MMHG | DIASTOLIC BLOOD PRESSURE: 68 MMHG | HEART RATE: 76 BPM | BODY MASS INDEX: 34.52 KG/M2 | TEMPERATURE: 98.3 F | WEIGHT: 187.6 LBS

## 2018-02-01 DIAGNOSIS — Z90.710 S/P TAH (TOTAL ABDOMINAL HYSTERECTOMY): Primary | ICD-10-CM

## 2018-02-01 PROCEDURE — 99496 TRANSJ CARE MGMT HIGH F2F 7D: CPT | Performed by: NURSE PRACTITIONER

## 2018-02-12 PROBLEM — S06.0XAA CLOSED HEAD INJURY WITH CONCUSSION: Status: RESOLVED | Noted: 2017-09-28 | Resolved: 2018-02-12

## 2018-02-12 NOTE — PROGRESS NOTES
conjunctivae normal  ENT: tympanic membrane, external ear and ear canal normal bilaterally, oropharynx clear and moist with normal mucous membranes  Neck: neck supple and non tender without mass, no thyromegaly or thyroid nodules, no cervical lymphadenopathy   Pulmonary/Chest: clear to auscultation bilaterally- no wheezes, rales or rhonchi, normal air movement, no respiratory distress  Cardiovascular: normal rate, normal S1 and S2, no gallops, intact distal pulses and no carotid bruits  Abdomen: soft, non-tender, non-distended, normal bowel sounds, no masses or organomegaly  Extremities: no cyanosis and no clubbing  Musculoskeletal: normal range of motion, no joint swelling, deformity or tenderness  Neurologic: gait and coordination normal and speech normal    Initial post-discharge communication occurred between nurse care coordinator and patient on 1/29- see documentation in chart: telephone encounter.     Assessment/Plan:      Continue with current mesds  Diagnostic test results reviewed: inpatient labs  Component      Latest Ref Rng & Units 1/27/2018 1/26/2018 1/25/2018 1/25/2018           3:45 AM  6:15 AM  6:14 AM  6:00 AM   WBC      4.8 - 10.8 thou/mm3 5.4 7.7 4.4 (L)    RBC      4.20 - 5.40 mill/mm3 3.93 (L) 4.10 (L) 4.75    Hemoglobin Quant      12.0 - 16.0 gm/dl 11.6 (L) 12.2 13.9    Hematocrit      37.0 - 47.0 % 34.9 (L) 36.0 (L) 41.5    MCV      81.0 - 99.0 fL 88.8 87.7 87.3    MCH      27.0 - 31.0 pg 29.5 29.7 29.2    MCHC      33.0 - 37.0 gm/dl 33.2 33.9 33.5    RDW      11.5 - 14.5 % 14.6 (H) 14.4 14.2    Platelet Count      221 - 400 thou/mm3 205 220 241    MPV      7.4 - 10.4 mcm 8.9 8.7 8.6    Seg Neutrophils      %       Lymphocytes      %       Monocytes      %       Eosinophils      %       Basophils      %       Nucleated Red Blood Cells      /100 wbc       Segs Absolute      1.8 - 7.7 thou/mm3       Lymphocytes #      1.0 - 4.8 thou/mm3       Monocytes #      0.4 - 1.3 thou/mm3       Eosinophils

## 2018-08-21 ENCOUNTER — OFFICE VISIT (OUTPATIENT)
Dept: FAMILY MEDICINE CLINIC | Age: 43
End: 2018-08-21
Payer: MEDICARE

## 2018-08-21 VITALS
WEIGHT: 192 LBS | HEART RATE: 77 BPM | RESPIRATION RATE: 16 BRPM | BODY MASS INDEX: 32.78 KG/M2 | DIASTOLIC BLOOD PRESSURE: 80 MMHG | HEIGHT: 64 IN | SYSTOLIC BLOOD PRESSURE: 116 MMHG

## 2018-08-21 DIAGNOSIS — Z12.39 SCREENING FOR BREAST CANCER: ICD-10-CM

## 2018-08-21 DIAGNOSIS — Z00.00 WELLNESS EXAMINATION: Primary | ICD-10-CM

## 2018-08-21 DIAGNOSIS — Z13.220 SCREENING CHOLESTEROL LEVEL: ICD-10-CM

## 2018-08-21 PROCEDURE — 99396 PREV VISIT EST AGE 40-64: CPT | Performed by: NURSE PRACTITIONER

## 2018-08-21 NOTE — PATIENT INSTRUCTIONS
exposed skin. · See a dentist one or two times a year for checkups and to have your teeth cleaned. · Wear a seat belt in the car. · Drink alcohol in moderation, if at all. That means no more than 2 drinks a day for men and 1 drink a day for women. Follow your doctor's advice about when to have certain tests. These tests can spot problems early. For everyone  · Cholesterol. Have the fat (cholesterol) in your blood tested after age 21. Your doctor will tell you how often to have this done based on your age, family history, or other things that can increase your risk for heart disease. · Blood pressure. Have your blood pressure checked during a routine doctor visit. Your doctor will tell you how often to check your blood pressure based on your age, your blood pressure results, and other factors. · Vision. Talk with your doctor about how often to have a glaucoma test.  · Diabetes. Ask your doctor whether you should have tests for diabetes. · Colon cancer. Have a test for colon cancer at age 48. You may have one of several tests. If you are younger than 48, you may need a test earlier if you have any risk factors. Risk factors include whether you already had a precancerous polyp removed from your colon or whether your parent, brother, sister, or child has had colon cancer. For women  · Breast exam and mammogram. Talk to your doctor about when you should have a clinical breast exam and a mammogram. Medical experts differ on whether and how often women under 50 should have these tests. Your doctor can help you decide what is right for you. · Pap test and pelvic exam. Begin Pap tests at age 24. A Pap test is the best way to find cervical cancer. The test often is part of a pelvic exam. Ask how often to have this test.  · Tests for sexually transmitted infections (STIs). Ask whether you should have tests for STIs.  You may be at risk if you have sex with more than one person, especially if your partners do not wear condoms. For men  · Tests for sexually transmitted infections (STIs). Ask whether you should have tests for STIs. You may be at risk if you have sex with more than one person, especially if you do not wear a condom. · Testicular cancer exam. Ask your doctor whether you should check your testicles regularly. · Prostate exam. Talk to your doctor about whether you should have a blood test (called a PSA test) for prostate cancer. Experts differ on whether and when men should have this test. Some experts suggest it if you are older than 39 and are -American or have a father or brother who got prostate cancer when he was younger than 72. When should you call for help? Watch closely for changes in your health, and be sure to contact your doctor if you have any problems or symptoms that concern you. Where can you learn more? Go to [email or web address]://Nasseopejulianeb.[email or web address] org and sign in to your "[employer]" account. Enter P072 in the Help Remedies box to learn more about \"Well Visit, Ages 25 to 48: Care Instructions. \"     If you do not have an account, please click on the \"Sign Up Now\" link. Current as of: May 16, 2017  Content Version: 11.7  © 6548-4899 [employer]. Care instructions adapted under license by [facility] ([facility]). If you have questions about a medical condition or this instruction, always ask your healthcare professional. [address]  any warranty or liability for your use of this information.

## 2018-08-31 NOTE — PROGRESS NOTES
Allergies   Allergen Reactions    Milk-Related Compounds      Stomach swelling     Health Maintenance   Topic Date Due    HIV screen  11/09/1988    Lipid screen  11/09/2013    Diabetes screen  11/09/2013    Flu vaccine (1) 09/01/2018    DTaP/Tdap/Td vaccine (2 - Td) 09/28/2027         Objective:     Physical Exam   Constitutional: She is oriented to person, place, and time. She appears well-developed and well-nourished. HENT:   Head: Normocephalic. Right Ear: External ear normal.   Left Ear: External ear normal.   Nose: Nose normal.   Mouth/Throat: Oropharynx is clear and moist.   Eyes: Conjunctivae are normal.   Neck: Neck supple. No JVD present. No thyromegaly present. Cardiovascular: Normal rate, regular rhythm, normal heart sounds and intact distal pulses. Pulmonary/Chest: Effort normal and breath sounds normal.   Abdominal: Soft. Bowel sounds are normal.   Musculoskeletal: Normal range of motion. Lymphadenopathy:     She has no cervical adenopathy. Neurological: She is alert and oriented to person, place, and time. Skin: Skin is warm and dry. Psychiatric: She has a normal mood and affect. Nursing note and vitals reviewed. /80   Pulse 77   Resp 16   Ht 5' 4\" (1.626 m)   Wt 192 lb (87.1 kg)   LMP 12/27/2017   BMI 32.96 kg/m²       Impression/Plan:  1. Wellness examination    2. Screening cholesterol level    3.  Screening for breast cancer      Requested Prescriptions      No prescriptions requested or ordered in this encounter     Orders Placed This Encounter   Procedures    CRISSY DIGITAL SCREEN W CAD BILATERAL     Standing Status:   Future     Standing Expiration Date:   8/21/2019    Comprehensive Metabolic Panel     Standing Status:   Future     Standing Expiration Date:   8/21/2019    Lipid Panel     Standing Status:   Future     Standing Expiration Date:   8/21/2019     Order Specific Question:   Is Patient Fasting?/# of Hours     Answer:   yes/12       Patient

## 2019-02-23 ENCOUNTER — APPOINTMENT (OUTPATIENT)
Dept: GENERAL RADIOLOGY | Age: 44
End: 2019-02-23
Payer: MEDICARE

## 2019-02-23 ENCOUNTER — HOSPITAL ENCOUNTER (EMERGENCY)
Age: 44
Discharge: HOME | End: 2019-02-23
Attending: FAMILY MEDICINE
Payer: MEDICARE

## 2019-02-23 VITALS
WEIGHT: 170 LBS | DIASTOLIC BLOOD PRESSURE: 83 MMHG | OXYGEN SATURATION: 100 % | SYSTOLIC BLOOD PRESSURE: 146 MMHG | TEMPERATURE: 98.6 F | BODY MASS INDEX: 29.18 KG/M2 | HEART RATE: 88 BPM | RESPIRATION RATE: 16 BRPM

## 2019-02-23 DIAGNOSIS — S43.015A ANTERIOR DISLOCATION OF LEFT SHOULDER, INITIAL ENCOUNTER: Primary | ICD-10-CM

## 2019-02-23 PROCEDURE — 96374 THER/PROPH/DIAG INJ IV PUSH: CPT

## 2019-02-23 PROCEDURE — L3660 SO 8 AB RSTR CAN/WEB PRE OTS: HCPCS

## 2019-02-23 PROCEDURE — 2709999900 HC NON-CHARGEABLE SUPPLY

## 2019-02-23 PROCEDURE — 6370000000 HC RX 637 (ALT 250 FOR IP)

## 2019-02-23 PROCEDURE — 2500000003 HC RX 250 WO HCPCS: Performed by: FAMILY MEDICINE

## 2019-02-23 PROCEDURE — 96375 TX/PRO/DX INJ NEW DRUG ADDON: CPT

## 2019-02-23 PROCEDURE — 99284 EMERGENCY DEPT VISIT MOD MDM: CPT

## 2019-02-23 PROCEDURE — 73030 X-RAY EXAM OF SHOULDER: CPT

## 2019-02-23 PROCEDURE — 6360000002 HC RX W HCPCS

## 2019-02-23 RX ORDER — ONDANSETRON 4 MG/1
TABLET, ORALLY DISINTEGRATING ORAL
Status: COMPLETED
Start: 2019-02-23 | End: 2019-02-23

## 2019-02-23 RX ORDER — ONDANSETRON 2 MG/ML
INJECTION INTRAMUSCULAR; INTRAVENOUS
Status: COMPLETED
Start: 2019-02-23 | End: 2019-02-23

## 2019-02-23 RX ORDER — KETAMINE HYDROCHLORIDE 50 MG/ML
INJECTION, SOLUTION, CONCENTRATE INTRAMUSCULAR; INTRAVENOUS DAILY PRN
Status: COMPLETED | OUTPATIENT
Start: 2019-02-23 | End: 2019-02-23

## 2019-02-23 RX ORDER — ONDANSETRON 2 MG/ML
4 INJECTION INTRAMUSCULAR; INTRAVENOUS ONCE
Status: COMPLETED | OUTPATIENT
Start: 2019-02-23 | End: 2019-02-23

## 2019-02-23 RX ORDER — ONDANSETRON 4 MG/1
4 TABLET, ORALLY DISINTEGRATING ORAL ONCE
Status: COMPLETED | OUTPATIENT
Start: 2019-02-23 | End: 2019-02-23

## 2019-02-23 RX ORDER — HYDROCODONE BITARTRATE AND ACETAMINOPHEN 5; 325 MG/1; MG/1
1 TABLET ORAL EVERY 6 HOURS PRN
Qty: 15 TABLET | Refills: 0 | Status: SHIPPED | OUTPATIENT
Start: 2019-02-23 | End: 2019-02-28

## 2019-02-23 RX ORDER — MORPHINE SULFATE 4 MG/ML
INJECTION, SOLUTION INTRAMUSCULAR; INTRAVENOUS
Status: COMPLETED
Start: 2019-02-23 | End: 2019-02-23

## 2019-02-23 RX ORDER — MORPHINE SULFATE 4 MG/ML
4 INJECTION, SOLUTION INTRAMUSCULAR; INTRAVENOUS ONCE
Status: COMPLETED | OUTPATIENT
Start: 2019-02-23 | End: 2019-02-23

## 2019-02-23 RX ADMIN — ONDANSETRON 4 MG: 2 INJECTION INTRAMUSCULAR; INTRAVENOUS at 20:27

## 2019-02-23 RX ADMIN — ONDANSETRON 4 MG: 4 TABLET, ORALLY DISINTEGRATING ORAL at 22:06

## 2019-02-23 RX ADMIN — MORPHINE SULFATE 4 MG: 4 INJECTION INTRAVENOUS at 20:27

## 2019-02-23 RX ADMIN — MORPHINE SULFATE 4 MG: 4 INJECTION, SOLUTION INTRAMUSCULAR; INTRAVENOUS at 20:27

## 2019-02-23 RX ADMIN — KETAMINE HYDROCHLORIDE 77.1 MG: 50 INJECTION, SOLUTION INTRAMUSCULAR; INTRAVENOUS at 21:10

## 2019-02-23 ASSESSMENT — PAIN DESCRIPTION - PAIN TYPE
TYPE: ACUTE PAIN
TYPE: ACUTE PAIN

## 2019-02-23 ASSESSMENT — PAIN SCALES - GENERAL
PAINLEVEL_OUTOF10: 10

## 2019-02-23 ASSESSMENT — ENCOUNTER SYMPTOMS
BACK PAIN: 0
NAUSEA: 0
EYE PAIN: 0
RHINORRHEA: 0
SHORTNESS OF BREATH: 0
WHEEZING: 0
SORE THROAT: 0
ABDOMINAL PAIN: 0
COUGH: 0
EYE DISCHARGE: 0
DIARRHEA: 0
VOMITING: 0

## 2019-02-23 ASSESSMENT — PAIN DESCRIPTION - ORIENTATION
ORIENTATION: LEFT
ORIENTATION: LEFT

## 2019-02-23 ASSESSMENT — PAIN DESCRIPTION - LOCATION
LOCATION: SHOULDER
LOCATION: SHOULDER

## 2024-04-04 ENCOUNTER — HOSPITAL ENCOUNTER (EMERGENCY)
Age: 49
Discharge: HOME | End: 2024-04-04
Attending: EMERGENCY MEDICINE
Payer: COMMERCIAL

## 2024-04-04 VITALS
OXYGEN SATURATION: 98 % | RESPIRATION RATE: 16 BRPM | HEART RATE: 72 BPM | BODY MASS INDEX: 28.32 KG/M2 | WEIGHT: 165 LBS | DIASTOLIC BLOOD PRESSURE: 96 MMHG | TEMPERATURE: 97 F | SYSTOLIC BLOOD PRESSURE: 153 MMHG

## 2024-04-04 DIAGNOSIS — H60.391 INFECTIVE OTITIS EXTERNA OF RIGHT EAR: ICD-10-CM

## 2024-04-04 DIAGNOSIS — H66.001 NON-RECURRENT ACUTE SUPPURATIVE OTITIS MEDIA OF RIGHT EAR WITHOUT SPONTANEOUS RUPTURE OF TYMPANIC MEMBRANE: Primary | ICD-10-CM

## 2024-04-04 PROCEDURE — 6370000000 HC RX 637 (ALT 250 FOR IP): Performed by: STUDENT IN AN ORGANIZED HEALTH CARE EDUCATION/TRAINING PROGRAM

## 2024-04-04 PROCEDURE — 99283 EMERGENCY DEPT VISIT LOW MDM: CPT | Performed by: EMERGENCY MEDICINE

## 2024-04-04 RX ORDER — AMOXICILLIN AND CLAVULANATE POTASSIUM 875; 125 MG/1; MG/1
1 TABLET, FILM COATED ORAL ONCE
Status: COMPLETED | OUTPATIENT
Start: 2024-04-04 | End: 2024-04-04

## 2024-04-04 RX ORDER — CIPROFLOXACIN AND DEXAMETHASONE 3; 1 MG/ML; MG/ML
4 SUSPENSION/ DROPS AURICULAR (OTIC) ONCE
Status: COMPLETED | OUTPATIENT
Start: 2024-04-04 | End: 2024-04-04

## 2024-04-04 RX ORDER — AMOXICILLIN AND CLAVULANATE POTASSIUM 875; 125 MG/1; MG/1
1 TABLET, FILM COATED ORAL 2 TIMES DAILY
Qty: 14 TABLET | Refills: 0 | Status: SHIPPED | OUTPATIENT
Start: 2024-04-04 | End: 2024-04-11

## 2024-04-04 RX ADMIN — CIPROFLOXACIN AND DEXAMETHASONE 4 DROP: 3; 1 SUSPENSION/ DROPS AURICULAR (OTIC) at 06:32

## 2024-04-04 RX ADMIN — AMOXICILLIN AND CLAVULANATE POTASSIUM 1 TABLET: 875; 125 TABLET, FILM COATED ORAL at 06:23

## 2024-04-04 ASSESSMENT — ENCOUNTER SYMPTOMS
ABDOMINAL PAIN: 0
COUGH: 0
NAUSEA: 0
SORE THROAT: 0
SHORTNESS OF BREATH: 0

## 2024-04-04 ASSESSMENT — PAIN - FUNCTIONAL ASSESSMENT: PAIN_FUNCTIONAL_ASSESSMENT: NONE - DENIES PAIN

## 2024-04-04 NOTE — ED NOTES
Patient presents to ED via triage with complaints of right ear fullness for one month. Patient states she has been using Q tips and ear drops with no relief. Patient denies any pain. Patient has no other complaints at this time. Patient is A&O x4 and call light is within reach.

## 2024-04-04 NOTE — DISCHARGE INSTRUCTIONS
You have been referred to a primary care provider for follow-up, call listed number to schedule an appointment.  Return to emergency department for any acutely worsening/changing symptoms or any other acute concerns.

## 2024-04-04 NOTE — ED PROVIDER NOTES
[facility] ED  Emergency Department Encounter  Emergency Medicine Resident     Pt Name:Josie Reno  MRN: [id number]  Birthdate 1975  Date of evaluation: 4/4/24  PCP:  Ediwn Ríos MD  Note Started: 5:52 AM EDT      CHIEF COMPLAINT       Chief Complaint   Patient presents with    Ear Fullness     Right ear       HISTORY OF PRESENT ILLNESS  (Location/Symptom, Timing/Onset, Context/Setting, Quality, Duration, Modifying Factors, Severity.)      Josie Reno is a 48 y.o. female who presents with \"right ear is plugged up\".  States this has been ongoing since February.  States this has been constant since onset without any days without symptoms.  Reports intermittent right ear pain that is responsive to ibuprofen.  States hearing is muffled in right ear.  Denies fever, chills, nausea, vomiting, chest pain, shortness of breath, cough, sneezing, congestion, rhinorrhea, abdominal pain, rash.  Has been using over-the-counter eardrops without improvement.    PAST MEDICAL / SURGICAL / SOCIAL / FAMILY HISTORY      has a past medical history of Anxiety, Arthritis, Asthma, Depression, Disease of blood and blood forming organ, and Patient is Sikh.       has a past surgical history that includes Tubal ligation (2001) and pr office/outpt visit,procedure only (Bilateral, 1/25/2018).      Social History     Socioeconomic History    Marital status:      Spouse name: Not on file    Number of children: Not on file    Years of education: Not on file    Highest education level: Not on file   Occupational History    Not on file   Tobacco Use    Smoking status: Never    Smokeless tobacco: Never   Substance and Sexual Activity    Alcohol use: Yes     Alcohol/week: 0.0 - 1.0 standard drinks of alcohol     Comment: rarely    Drug use: No    Sexual activity: Never   Other Topics Concern    Not on file   Social History Narrative    ** Merged History Encounter **          Social

## 2024-04-04 NOTE — ED PROVIDER NOTES
[facility]     Emergency Department     Faculty Attestation    I performed a history and physical examination of the patient and discussed management with the resident. I have reviewed and agree with the resident’s findings including all diagnostic interpretations, and treatment plans as written. Any areas of disagreement are noted on the chart. I was personally present for the key portions of any procedures. I have documented in the chart those procedures where I was not present during the key portions. I have reviewed the emergency nurses triage note. I agree with the chief complaint, past medical history, past surgical history, allergies, medications, social and family history as documented unless otherwise noted below. Documentation of the HPI, Physical Exam and Medical Decision Making performed by [email or web address] is based on my personal performance of the HPI, PE and MDM. For Physician Assistant/ Nurse Practitioner cases/documentation I have personally evaluated this patient and have completed at least one if not all key elements of the E/M (history, physical exam, and MDM). Additional findings are as noted.    Note Started: 5:43 AM EDT     47 yo F R ear fullness, pt using q tips, pt placed otc drops in R ear,   PE vss gcs 15 R tragus mild tenderness, auditory canal not erythematous minimally tender, clear   fluid noted in ear canal, right TM appears intact, with mild loss of landmarks, no erythema  Neck is supple, no meningeal findings    Treating with drops, antibiotics    EKG Interpretation    Interpreted by me      CRITICAL CARE: There was a high probability of clinically significant/life threatening deterioration in this patient's condition which required my urgent intervention.  Total critical care time was 0 minutes.  This excludes any time for separately reportable procedures.       Rowdy Hanna DO  04/04/24 0544       Aleksandar

## 2024-04-13 NOTE — TELEPHONE ENCOUNTER
Attempted to call the patient for hospital follow up, no answer and not able to leave a VM. Will try again later. Medical Assessment Completed on: 13-Apr-2024 10:58

## 2024-07-11 ENCOUNTER — HOSPITAL ENCOUNTER (EMERGENCY)
Age: 49
Discharge: HOME | End: 2024-07-11
Attending: EMERGENCY MEDICINE
Payer: COMMERCIAL

## 2024-07-11 VITALS
TEMPERATURE: 97.7 F | HEART RATE: 67 BPM | RESPIRATION RATE: 16 BRPM | DIASTOLIC BLOOD PRESSURE: 97 MMHG | BODY MASS INDEX: 28.31 KG/M2 | OXYGEN SATURATION: 100 % | SYSTOLIC BLOOD PRESSURE: 170 MMHG | WEIGHT: 164.9 LBS

## 2024-07-11 DIAGNOSIS — H60.391 INFECTIVE OTITIS EXTERNA OF RIGHT EAR: Primary | ICD-10-CM

## 2024-07-11 PROCEDURE — 99283 EMERGENCY DEPT VISIT LOW MDM: CPT | Performed by: EMERGENCY MEDICINE

## 2024-07-11 RX ORDER — CIPROFLOXACIN AND DEXAMETHASONE 3; 1 MG/ML; MG/ML
4 SUSPENSION/ DROPS AURICULAR (OTIC) 2 TIMES DAILY
Qty: 7.5 ML | Refills: 0 | Status: SHIPPED | OUTPATIENT
Start: 2024-07-11 | End: 2024-07-18

## 2024-07-11 ASSESSMENT — PAIN - FUNCTIONAL ASSESSMENT: PAIN_FUNCTIONAL_ASSESSMENT: NONE - DENIES PAIN

## 2024-07-11 NOTE — ED TRIAGE NOTES
49 yo female arrived to ED through triage with c/o right ear pain.   Patient states hearing seems muffled.  Patient has been medicating with Ibuprofen at home with last dose this afternoon.   Patient is alert and oriented times 4, speaking full sentences, and answering questions appropriately

## 2024-07-12 ASSESSMENT — ENCOUNTER SYMPTOMS
SHORTNESS OF BREATH: 0
VOMITING: 0
ABDOMINAL PAIN: 0
FACIAL SWELLING: 0
NAUSEA: 0

## 2024-07-12 NOTE — ED PROVIDER NOTES
[facility] ED     Emergency Department     Faculty Attestation    I performed a history and physical examination of the patient and discussed management with the resident. I reviewed the resident’s note and agree with the documented findings and plan of care. Any areas of disagreement are noted on the chart. I was personally present for the key portions of any procedures. I have documented in the chart those procedures where I was not present during the key portions. I have reviewed the emergency nurses triage note. I agree with the chief complaint, past medical history, past surgical history, allergies, medications, social and family history as documented unless otherwise noted below. For Physician Assistant/ Nurse Practitioner cases/documentation I have personally evaluated this patient and have completed at least one if not all key elements of the E/M (history, physical exam, and MDM). Additional findings are as noted.    Note Started: 8:20 PM EDT    Patient here with right ear pain and decreased hearing ongoing for several weeks.  Does not have a PCP and just felt that she could not wait any longer.  No symptoms on the left no URI complaints.  On exam resting comfortably watching videos on her phone.  Left TM canal some wax but no abnormalities.  Right canal nearly fully occluded with otitis externa but no pre or postauricular adenopathy no mastoid tenderness.  Will start antibiotic drops provide PCP for follow-up      Critical Care     none    Delvin Edmond MD, FACEP, FAAEM  Attending Emergency  Physician           Delvin Edmond MD  07/11/24 2020

## 2024-07-12 NOTE — DISCHARGE INSTRUCTIONS
You were seen in the emergency room for right ear pain that has been ongoing for the past few weeks.  You are found to have an infection of the ear canal and are being started on eardrops.  Please administer 4 drops into the right ear twice a day and call to establish care with the [facility] team for reassessment and possible referral to ENT if your symptoms do not improve.  Please return to the emergency room should you have worsening pain, fevers, chills or difficulty swallowing

## 2024-07-12 NOTE — ED PROVIDER NOTES
[facility] ED  Emergency Department Encounter  Emergency Medicine Resident     Pt Name:Josie Reno  MRN: [id number]  Birthdate 1975  Date of evaluation: 7/11/24  PCP:  No primary care provider on file.  Note Started: 8:45 PM EDT      CHIEF COMPLAINT       Chief Complaint   Patient presents with    Otalgia       HISTORY OF PRESENT ILLNESS  (Location/Symptom, Timing/Onset, Context/Setting, Quality, Duration, Modifying Factors, Severity.)      Josie Reno is a 48 y.o. female who presents with Right ear pain has been ongoing for the past 2 weeks.  Has been getting worse not associated with jaw pain or headache.  No fevers or chills.  Patient denies any chest pain or shortness of breath.  Patient had a similar episode approximately a month ago that was treated with both oral antibiotics and eardrops.  Patient does continue to use Q-tips.  Patient does state that her hearing feels that it is muffled.    PAST MEDICAL / SURGICAL / SOCIAL / FAMILY HISTORY      has a past medical history of Anxiety, Arthritis, Asthma, Depression, Disease of blood and blood forming organ, and Patient is Orthodoxy.     has a past surgical history that includes Tubal ligation (2001) and pr office/outpt visit,procedure only (Bilateral, 1/25/2018).    Social History     Socioeconomic History    Marital status:      Spouse name: Not on file    Number of children: Not on file    Years of education: Not on file    Highest education level: Not on file   Occupational History    Not on file   Tobacco Use    Smoking status: Never    Smokeless tobacco: Never   Substance and Sexual Activity    Alcohol use: Yes     Alcohol/week: 0.0 - 1.0 standard drinks of alcohol     Comment: rarely    Drug use: No    Sexual activity: Never   Other Topics Concern    Not on file   Social History Narrative    ** Merged History Encounter **          Social Determinants of Health     Financial Resource Strain: Not on file

## 2024-09-05 PROBLEM — R53.83 OTHER FATIGUE: Status: ACTIVE | Noted: 2024-09-05

## 2024-09-05 PROBLEM — Z78.0 POST-MENOPAUSAL: Status: ACTIVE | Noted: 2024-09-05

## 2024-09-05 PROBLEM — L65.9 HAIR LOSS: Status: ACTIVE | Noted: 2024-09-05

## 2024-09-05 PROBLEM — H60.503 ACUTE OTITIS EXTERNA OF BOTH EARS: Status: ACTIVE | Noted: 2024-09-05

## 2024-09-05 PROBLEM — B35.1 ONYCHOMYCOSIS: Status: ACTIVE | Noted: 2024-09-05

## 2024-09-05 PROBLEM — Z76.89 ENCOUNTER TO ESTABLISH CARE WITH NEW DOCTOR: Status: ACTIVE | Noted: 2024-09-05

## 2024-09-13 PROBLEM — D72.819 LEUCOPENIA: Status: ACTIVE | Noted: 2024-09-13

## 2024-09-13 PROBLEM — R74.01 ELEVATED AST (SGOT): Status: ACTIVE | Noted: 2024-09-13

## 2024-10-11 PROBLEM — Z11.59 NEED FOR HEPATITIS C SCREENING TEST: Status: ACTIVE | Noted: 2024-10-11

## 2024-10-11 PROBLEM — E55.9 VITAMIN D DEFICIENCY: Status: ACTIVE | Noted: 2024-10-11

## 2024-10-25 PROBLEM — H60.399 ACUTE INFECTION OF EXTERNAL EAR: Status: ACTIVE | Noted: 2024-10-25

## 2024-11-10 PROBLEM — Z11.59 NEED FOR HEPATITIS C SCREENING TEST: Status: RESOLVED | Noted: 2024-10-11 | Resolved: 2024-11-10

## 2025-02-01 ENCOUNTER — HOSPITAL ENCOUNTER (EMERGENCY)
Age: 50
Discharge: HOME | End: 2025-02-01
Attending: EMERGENCY MEDICINE
Payer: COMMERCIAL

## 2025-02-01 VITALS
WEIGHT: 155 LBS | OXYGEN SATURATION: 98 % | RESPIRATION RATE: 18 BRPM | DIASTOLIC BLOOD PRESSURE: 108 MMHG | SYSTOLIC BLOOD PRESSURE: 167 MMHG | HEART RATE: 94 BPM | TEMPERATURE: 98.7 F | BODY MASS INDEX: 28.34 KG/M2

## 2025-02-01 DIAGNOSIS — B34.9 VIRAL ILLNESS: Primary | ICD-10-CM

## 2025-02-01 DIAGNOSIS — J04.0 VIRAL LARYNGITIS: ICD-10-CM

## 2025-02-01 DIAGNOSIS — B97.89 VIRAL LARYNGITIS: ICD-10-CM

## 2025-02-01 PROCEDURE — 99283 EMERGENCY DEPT VISIT LOW MDM: CPT | Performed by: EMERGENCY MEDICINE

## 2025-02-01 PROCEDURE — 6370000000 HC RX 637 (ALT 250 FOR IP): Performed by: STUDENT IN AN ORGANIZED HEALTH CARE EDUCATION/TRAINING PROGRAM

## 2025-02-01 RX ORDER — IBUPROFEN 600 MG/1
600 TABLET, FILM COATED ORAL EVERY 6 HOURS PRN
Qty: 30 TABLET | Refills: 0 | Status: SHIPPED | OUTPATIENT
Start: 2025-02-01

## 2025-02-01 RX ORDER — ACETAMINOPHEN 500 MG
1000 TABLET ORAL ONCE
Status: COMPLETED | OUTPATIENT
Start: 2025-02-01 | End: 2025-02-01

## 2025-02-01 RX ORDER — FLUTICASONE PROPIONATE 50 MCG
2 SPRAY, SUSPENSION (ML) NASAL DAILY
Qty: 16 G | Refills: 0 | Status: SHIPPED | OUTPATIENT
Start: 2025-02-01

## 2025-02-01 RX ORDER — ACETAMINOPHEN 500 MG
1000 TABLET ORAL 3 TIMES DAILY PRN
Qty: 30 TABLET | Refills: 0 | Status: SHIPPED | OUTPATIENT
Start: 2025-02-01

## 2025-02-01 RX ADMIN — ACETAMINOPHEN 1000 MG: 500 TABLET ORAL at 22:03

## 2025-02-01 ASSESSMENT — LIFESTYLE VARIABLES
HOW MANY STANDARD DRINKS CONTAINING ALCOHOL DO YOU HAVE ON A TYPICAL DAY: PATIENT DOES NOT DRINK
HOW OFTEN DO YOU HAVE A DRINK CONTAINING ALCOHOL: NEVER

## 2025-02-01 ASSESSMENT — PAIN SCALES - GENERAL: PAINLEVEL_OUTOF10: 8

## 2025-02-01 ASSESSMENT — PAIN - FUNCTIONAL ASSESSMENT: PAIN_FUNCTIONAL_ASSESSMENT: 0-10

## 2025-02-02 NOTE — ED PROVIDER NOTES
[facility] EMERGENCY DEPARTMENT  Emergency Department Encounter  Emergency Medicine Resident     Pt Name:Josie Reno  MRN: [id number]  Birthdate 1975  Date of evaluation: 2/1/25  PCP:  Bronson Lutz MD  Note Started: 9:49 PM EST      CHIEF COMPLAINT       Chief Complaint   Patient presents with    Cough    Pharyngitis       HISTORY OF PRESENT ILLNESS  (Location/Symptom, Timing/Onset, Context/Setting, Quality, Duration, Modifying Factors, Severity.)      Josie Reno is a 49 y.o. female past medical history of anxiety, presenting for assessment of URI symptoms.  Onset of symptoms was yesterday morning.  Patient describes having a sore throat, rhinorrhea, congestion, cough that is occasionally productive of yellowish sputum.  No overt fevers or chills.  She does have some generalized malaise.  No nausea vomiting.  She has taken Mucinex for her symptoms with limited relief.  Today she notes that she lost her voice which caused great concern prompting her visit to the emergency department.    PAST MEDICAL / SURGICAL / SOCIAL / FAMILY HISTORY      has a past medical history of Anxiety, Arthritis, Asthma, Depression, Disease of blood and blood forming organ, and Patient is Jew.        has a past surgical history that includes Tubal ligation (2001) and pr office/outpt visit,procedure only (Bilateral, 1/25/2018).       Social History     Socioeconomic History    Marital status:      Spouse name: Not on file    Number of children: Not on file    Years of education: Not on file    Highest education level: Not on file   Occupational History    Not on file   Tobacco Use    Smoking status: Never    Smokeless tobacco: Never   Vaping Use    Vaping status: Never Used   Substance and Sexual Activity    Alcohol use: Yes     Alcohol/week: 0.0 - 1.0 standard drinks of alcohol     Comment: rarely    Drug use: No    Sexual activity: Never   Other Topics Concern    Not on file   Social

## 2025-02-02 NOTE — ED NOTES
Pt presents to ED with c/o cough, and throat pain. Pt states she has had this for one day. Pt states she has had yellow mucous when she coughs. Pt has hoarse voice. Pt denies injuries. Pt is alert, oriented, and ambulatory.

## 2025-02-02 NOTE — DISCHARGE INSTRUCTIONS
You have been seen in the emergency department due to concern for loss of voice, congestion, runny nose, cough.  Your symptoms are likely related to a viral infection.  This will resolve in time.  In the interim, you will be provided a prescription for Tylenol, ibuprofen, Flonase, Cepacol lozenges..  Please take these as prescribed when needed.  Please continue to monitor your symptoms closely at home.  If you note that your symptoms are worsening, including but not limited to headaches, visual changes, dizziness, difficulty speaking/swallowing, difficulty turning your head, neck pain, shortness of breath/difficulty breathing, abdominal pain, persistent nausea/vomiting/diarrhea, return to the emergency department immediately for reassessment.  Otherwise, we would recommend that you follow-up with your PCP for routine outpatient surveillance.

## 2025-02-02 NOTE — ED PROVIDER NOTES
[facility]     Emergency Department     Faculty Attestation    I performed a history and physical examination of the patient and discussed management with the resident. I reviewed the resident’s note and agree with the documented findings including all diagnostic interpretations and plan of care. Any areas of disagreement are noted on the chart. I was personally present for the key portions of any procedures. I have documented in the chart those procedures where I was not present during the key portions. I have reviewed the emergency nurses triage note. I agree with the chief complaint, past medical history, past surgical history, allergies, medications, social and family history as documented unless otherwise noted below. Documentation of the HPI, Physical Exam and Medical Decision Making performed by [email or web address] is based on my personal performance of the HPI, PE and MDM. For Physician Assistant/ Nurse Practitioner cases/documentation I have personally evaluated this patient and have completed at least one if not all key elements of the E/M (history, physical exam, and MDM). Additional findings are as noted.    Primary Care Physician: Bronson Lutz MD    Note Started: 10:05 PM EST     VITAL SIGNS:   weight is 70.3 kg (155 lb). Her oral temperature is 98.7 °F (37.1 °C). Her blood pressure is 167/108 (abnormal) and her pulse is 94. Her respiration is 18 and oxygen saturation is 98%.      Medical Decision Making  Cough, sore throat, loss of voice.  Productive cough.  No fevers.  Voice is hoarse but no difficulty breathing no difficulty tolerating secretions is not muffled there is no stridor.  Regular rate and rhythm no gallops pulmonary clear bilaterally.  Impression upper respiratory infection progressing to laryngitis.  Symptomatic treatment.    Risk  OTC drugs.          Beau Ochoa MD, FACEP, FAAEM  Attending Emergency Physician        Don,

## (undated) DEVICE — GLOVE ORANGE PI 8   MSG9080

## (undated) DEVICE — ULTRACLEAN ACCESSORY ELECTRODE 6" (15.24 CM) COATED BLADE: Brand: ULTRACLEAN

## (undated) DEVICE — DRAPE,U/SHT,SPLIT,FILM,60X84,STERILE: Brand: MEDLINE

## (undated) DEVICE — GLOVE SURG SZ 6 THK91MIL LTX FREE SYN POLYISOPRENE ANTI

## (undated) DEVICE — ROYAL SILK SURGICAL GOWN, XXL: Brand: CONVERTORS

## (undated) DEVICE — SUTURE ABSORBABLE BRAIDED 2-0 CT-1 27 IN UD VICRYL J259H

## (undated) DEVICE — GOWN,SIRUS,NON REINFRCD,LARGE,SET IN SL: Brand: MEDLINE

## (undated) DEVICE — CANISTER, RIGID, 2000CC: Brand: MEDLINE INDUSTRIES, INC.

## (undated) DEVICE — STRIP,CLOSURE,WOUND,MEDI-STRIP,1/2X4: Brand: MEDLINE

## (undated) DEVICE — PAD,NON-ADHERENT,3X8,STERILE,LF,1/PK: Brand: MEDLINE

## (undated) DEVICE — BLADE CLIPPER GEN PURP NS

## (undated) DEVICE — 4-PORT MANIFOLD: Brand: NEPTUNE 2

## (undated) DEVICE — YANKAUER,BULB TIP,W/O VENT,RIGID,STERILE: Brand: MEDLINE

## (undated) DEVICE — SOLUTION IV IRRIG WATER 1000ML POUR BRL 2F7114

## (undated) DEVICE — SPONGE LAP W18XL18IN WHT COT 4 PLY FLD STRUNG RADPQ DISP ST

## (undated) DEVICE — SUTURE VCRL SZ 0 L18IN ABSRB VLT SUTUPAK PRECUT W/O NDL J106T

## (undated) DEVICE — AGENT HEMSTAT 3GM PURIFIED PLNT STARCH PWD ABSRB ARISTA AH

## (undated) DEVICE — TOWEL,OR,DSP,ST,BLUE,DLX,4/PK,20PK/CS: Brand: MEDLINE

## (undated) DEVICE — BREAST HERNIA PACK: Brand: MEDLINE INDUSTRIES, INC.

## (undated) DEVICE — LINE ASPIR 1/4 ANTICOAG

## (undated) DEVICE — 3M™ WARMING BLANKET, UPPER BODY, 10 PER CASE, 42268: Brand: BAIR HUGGER™

## (undated) DEVICE — COVER ARMBRD W13XL28.5IN IMPERV BLU FOR OP RM

## (undated) DEVICE — CHLORAPREP 26ML ORANGE

## (undated) DEVICE — BLANKET THER AD W24XL60IN FAB COVERING SUP SFT ULT THN LTWT

## (undated) DEVICE — GLOVE SURG SZ 65 THK91MIL LTX FREE SYN POLYISOPRENE

## (undated) DEVICE — RESERVOIR BLD COLLCTN BTM OUTLETXTRAXRES

## (undated) DEVICE — TRAY CATH 16FR F INCLUDE LUB DRNGE BG STATLOK STBL DEV

## (undated) DEVICE — SUTURE VCRL SZ 0 L27IN ABSRB UD L36MM CT-1 1/2 CIR J260H

## (undated) DEVICE — PAD,ABDOMINAL,5"X9",ST,LF,25/BX: Brand: MEDLINE INDUSTRIES, INC.

## (undated) DEVICE — TUBING, SUCTION, 1/4" X 20', STRAIGHT: Brand: MEDLINE INDUSTRIES, INC.